# Patient Record
Sex: FEMALE
[De-identification: names, ages, dates, MRNs, and addresses within clinical notes are randomized per-mention and may not be internally consistent; named-entity substitution may affect disease eponyms.]

---

## 2022-11-03 ENCOUNTER — RESULT REVIEW (OUTPATIENT)
Age: 42
End: 2022-11-03

## 2022-11-03 ENCOUNTER — APPOINTMENT (OUTPATIENT)
Dept: RADIOLOGY | Facility: HOSPITAL | Age: 42
End: 2022-11-03

## 2022-11-03 ENCOUNTER — OUTPATIENT (OUTPATIENT)
Dept: OUTPATIENT SERVICES | Facility: HOSPITAL | Age: 42
LOS: 1 days | End: 2022-11-03

## 2022-11-03 DIAGNOSIS — R76.11 NONSPECIFIC REACTION TO TUBERCULIN SKIN TEST WITHOUT ACTIVE TUBERCULOSIS: ICD-10-CM

## 2022-11-03 PROCEDURE — 71046 X-RAY EXAM CHEST 2 VIEWS: CPT | Mod: 26

## 2024-01-01 ENCOUNTER — EMERGENCY (EMERGENCY)
Facility: HOSPITAL | Age: 44
LOS: 0 days | Discharge: ANOTHER HOSPITAL | End: 2024-01-02
Attending: STUDENT IN AN ORGANIZED HEALTH CARE EDUCATION/TRAINING PROGRAM
Payer: COMMERCIAL

## 2024-01-01 VITALS
RESPIRATION RATE: 20 BRPM | SYSTOLIC BLOOD PRESSURE: 118 MMHG | WEIGHT: 162.04 LBS | TEMPERATURE: 99 F | HEART RATE: 158 BPM | DIASTOLIC BLOOD PRESSURE: 74 MMHG | HEIGHT: 63 IN
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
## 2024-01-01 DIAGNOSIS — Z98.84 BARIATRIC SURGERY STATUS: ICD-10-CM
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
## 2024-01-01 DIAGNOSIS — F10.129 ALCOHOL ABUSE WITH INTOXICATION, UNSPECIFIED: ICD-10-CM

## 2024-01-01 DIAGNOSIS — S00.81XA ABRASION OF OTHER PART OF HEAD, INITIAL ENCOUNTER: ICD-10-CM

## 2024-01-01 DIAGNOSIS — F29 UNSPECIFIED PSYCHOSIS NOT DUE TO A SUBSTANCE OR KNOWN PHYSIOLOGICAL CONDITION: ICD-10-CM

## 2024-01-01 DIAGNOSIS — R45.1 RESTLESSNESS AND AGITATION: ICD-10-CM

## 2024-01-01 DIAGNOSIS — Z20.822 CONTACT WITH AND (SUSPECTED) EXPOSURE TO COVID-19: ICD-10-CM

## 2024-01-01 LAB
ALBUMIN SERPL ELPH-MCNC: 3.4 G/DL — SIGNIFICANT CHANGE UP (ref 3.3–5)
ALBUMIN SERPL ELPH-MCNC: 3.4 G/DL — SIGNIFICANT CHANGE UP (ref 3.3–5)
ALP SERPL-CCNC: 72 U/L — SIGNIFICANT CHANGE UP (ref 40–120)
ALP SERPL-CCNC: 72 U/L — SIGNIFICANT CHANGE UP (ref 40–120)
ALT FLD-CCNC: 50 U/L — SIGNIFICANT CHANGE UP (ref 12–78)
ALT FLD-CCNC: 50 U/L — SIGNIFICANT CHANGE UP (ref 12–78)
ANION GAP SERPL CALC-SCNC: 8 MMOL/L — SIGNIFICANT CHANGE UP (ref 5–17)
ANION GAP SERPL CALC-SCNC: 8 MMOL/L — SIGNIFICANT CHANGE UP (ref 5–17)
AST SERPL-CCNC: 39 U/L — HIGH (ref 15–37)
AST SERPL-CCNC: 39 U/L — HIGH (ref 15–37)
BASOPHILS # BLD AUTO: 0.05 K/UL — SIGNIFICANT CHANGE UP (ref 0–0.2)
BASOPHILS # BLD AUTO: 0.05 K/UL — SIGNIFICANT CHANGE UP (ref 0–0.2)
BASOPHILS NFR BLD AUTO: 0.9 % — SIGNIFICANT CHANGE UP (ref 0–2)
BASOPHILS NFR BLD AUTO: 0.9 % — SIGNIFICANT CHANGE UP (ref 0–2)
BILIRUB SERPL-MCNC: 0.3 MG/DL — SIGNIFICANT CHANGE UP (ref 0.2–1.2)
BILIRUB SERPL-MCNC: 0.3 MG/DL — SIGNIFICANT CHANGE UP (ref 0.2–1.2)
BUN SERPL-MCNC: 11 MG/DL — SIGNIFICANT CHANGE UP (ref 7–23)
BUN SERPL-MCNC: 11 MG/DL — SIGNIFICANT CHANGE UP (ref 7–23)
CALCIUM SERPL-MCNC: 8.5 MG/DL — SIGNIFICANT CHANGE UP (ref 8.5–10.1)
CALCIUM SERPL-MCNC: 8.5 MG/DL — SIGNIFICANT CHANGE UP (ref 8.5–10.1)
CHLORIDE SERPL-SCNC: 113 MMOL/L — HIGH (ref 96–108)
CHLORIDE SERPL-SCNC: 113 MMOL/L — HIGH (ref 96–108)
CO2 SERPL-SCNC: 21 MMOL/L — LOW (ref 22–31)
CO2 SERPL-SCNC: 21 MMOL/L — LOW (ref 22–31)
CREAT SERPL-MCNC: 0.75 MG/DL — SIGNIFICANT CHANGE UP (ref 0.5–1.3)
CREAT SERPL-MCNC: 0.75 MG/DL — SIGNIFICANT CHANGE UP (ref 0.5–1.3)
EGFR: 101 ML/MIN/1.73M2 — SIGNIFICANT CHANGE UP
EGFR: 101 ML/MIN/1.73M2 — SIGNIFICANT CHANGE UP
EOSINOPHIL # BLD AUTO: 0.16 K/UL — SIGNIFICANT CHANGE UP (ref 0–0.5)
EOSINOPHIL # BLD AUTO: 0.16 K/UL — SIGNIFICANT CHANGE UP (ref 0–0.5)
EOSINOPHIL NFR BLD AUTO: 2.9 % — SIGNIFICANT CHANGE UP (ref 0–6)
EOSINOPHIL NFR BLD AUTO: 2.9 % — SIGNIFICANT CHANGE UP (ref 0–6)
ETHANOL SERPL-MCNC: 140 MG/DL — HIGH (ref 0–10)
ETHANOL SERPL-MCNC: 140 MG/DL — HIGH (ref 0–10)
GLUCOSE SERPL-MCNC: 113 MG/DL — HIGH (ref 70–99)
GLUCOSE SERPL-MCNC: 113 MG/DL — HIGH (ref 70–99)
HCG SERPL-ACNC: 1 MIU/ML — SIGNIFICANT CHANGE UP
HCG SERPL-ACNC: 1 MIU/ML — SIGNIFICANT CHANGE UP
HCT VFR BLD CALC: 37.2 % — SIGNIFICANT CHANGE UP (ref 34.5–45)
HCT VFR BLD CALC: 37.2 % — SIGNIFICANT CHANGE UP (ref 34.5–45)
HGB BLD-MCNC: 13.1 G/DL — SIGNIFICANT CHANGE UP (ref 11.5–15.5)
HGB BLD-MCNC: 13.1 G/DL — SIGNIFICANT CHANGE UP (ref 11.5–15.5)
IMM GRANULOCYTES NFR BLD AUTO: 0.2 % — SIGNIFICANT CHANGE UP (ref 0–0.9)
IMM GRANULOCYTES NFR BLD AUTO: 0.2 % — SIGNIFICANT CHANGE UP (ref 0–0.9)
LYMPHOCYTES # BLD AUTO: 1.7 K/UL — SIGNIFICANT CHANGE UP (ref 1–3.3)
LYMPHOCYTES # BLD AUTO: 1.7 K/UL — SIGNIFICANT CHANGE UP (ref 1–3.3)
LYMPHOCYTES # BLD AUTO: 30.5 % — SIGNIFICANT CHANGE UP (ref 13–44)
LYMPHOCYTES # BLD AUTO: 30.5 % — SIGNIFICANT CHANGE UP (ref 13–44)
MCHC RBC-ENTMCNC: 27.9 PG — SIGNIFICANT CHANGE UP (ref 27–34)
MCHC RBC-ENTMCNC: 27.9 PG — SIGNIFICANT CHANGE UP (ref 27–34)
MCHC RBC-ENTMCNC: 35.2 G/DL — SIGNIFICANT CHANGE UP (ref 32–36)
MCHC RBC-ENTMCNC: 35.2 G/DL — SIGNIFICANT CHANGE UP (ref 32–36)
MCV RBC AUTO: 79.1 FL — LOW (ref 80–100)
MCV RBC AUTO: 79.1 FL — LOW (ref 80–100)
MONOCYTES # BLD AUTO: 0.38 K/UL — SIGNIFICANT CHANGE UP (ref 0–0.9)
MONOCYTES # BLD AUTO: 0.38 K/UL — SIGNIFICANT CHANGE UP (ref 0–0.9)
MONOCYTES NFR BLD AUTO: 6.8 % — SIGNIFICANT CHANGE UP (ref 2–14)
MONOCYTES NFR BLD AUTO: 6.8 % — SIGNIFICANT CHANGE UP (ref 2–14)
NEUTROPHILS # BLD AUTO: 3.28 K/UL — SIGNIFICANT CHANGE UP (ref 1.8–7.4)
NEUTROPHILS # BLD AUTO: 3.28 K/UL — SIGNIFICANT CHANGE UP (ref 1.8–7.4)
NEUTROPHILS NFR BLD AUTO: 58.7 % — SIGNIFICANT CHANGE UP (ref 43–77)
NEUTROPHILS NFR BLD AUTO: 58.7 % — SIGNIFICANT CHANGE UP (ref 43–77)
NRBC # BLD: 0 /100 WBCS — SIGNIFICANT CHANGE UP (ref 0–0)
NRBC # BLD: 0 /100 WBCS — SIGNIFICANT CHANGE UP (ref 0–0)
PLATELET # BLD AUTO: 274 K/UL — SIGNIFICANT CHANGE UP (ref 150–400)
PLATELET # BLD AUTO: 274 K/UL — SIGNIFICANT CHANGE UP (ref 150–400)
POTASSIUM SERPL-MCNC: 3.8 MMOL/L — SIGNIFICANT CHANGE UP (ref 3.5–5.3)
POTASSIUM SERPL-MCNC: 3.8 MMOL/L — SIGNIFICANT CHANGE UP (ref 3.5–5.3)
POTASSIUM SERPL-SCNC: 3.8 MMOL/L — SIGNIFICANT CHANGE UP (ref 3.5–5.3)
POTASSIUM SERPL-SCNC: 3.8 MMOL/L — SIGNIFICANT CHANGE UP (ref 3.5–5.3)
PROT SERPL-MCNC: 7.1 GM/DL — SIGNIFICANT CHANGE UP (ref 6–8.3)
PROT SERPL-MCNC: 7.1 GM/DL — SIGNIFICANT CHANGE UP (ref 6–8.3)
RBC # BLD: 4.7 M/UL — SIGNIFICANT CHANGE UP (ref 3.8–5.2)
RBC # BLD: 4.7 M/UL — SIGNIFICANT CHANGE UP (ref 3.8–5.2)
RBC # FLD: 15.2 % — HIGH (ref 10.3–14.5)
RBC # FLD: 15.2 % — HIGH (ref 10.3–14.5)
SODIUM SERPL-SCNC: 142 MMOL/L — SIGNIFICANT CHANGE UP (ref 135–145)
SODIUM SERPL-SCNC: 142 MMOL/L — SIGNIFICANT CHANGE UP (ref 135–145)
WBC # BLD: 5.58 K/UL — SIGNIFICANT CHANGE UP (ref 3.8–10.5)
WBC # BLD: 5.58 K/UL — SIGNIFICANT CHANGE UP (ref 3.8–10.5)
WBC # FLD AUTO: 5.58 K/UL — SIGNIFICANT CHANGE UP (ref 3.8–10.5)
WBC # FLD AUTO: 5.58 K/UL — SIGNIFICANT CHANGE UP (ref 3.8–10.5)

## 2024-01-01 PROCEDURE — 90792 PSYCH DIAG EVAL W/MED SRVCS: CPT | Mod: 95

## 2024-01-01 PROCEDURE — 99285 EMERGENCY DEPT VISIT HI MDM: CPT

## 2024-01-01 RX ORDER — MIDAZOLAM HYDROCHLORIDE 1 MG/ML
2 INJECTION, SOLUTION INTRAMUSCULAR; INTRAVENOUS ONCE
Refills: 0 | Status: DISCONTINUED | OUTPATIENT
Start: 2024-01-01 | End: 2024-01-01

## 2024-01-01 RX ORDER — HALOPERIDOL DECANOATE 100 MG/ML
5 INJECTION INTRAMUSCULAR ONCE
Refills: 0 | Status: COMPLETED | OUTPATIENT
Start: 2024-01-01 | End: 2024-01-01

## 2024-01-01 RX ORDER — KETAMINE HYDROCHLORIDE 100 MG/ML
250 INJECTION INTRAMUSCULAR; INTRAVENOUS ONCE
Refills: 0 | Status: DISCONTINUED | OUTPATIENT
Start: 2024-01-01 | End: 2024-01-01

## 2024-01-01 RX ORDER — SODIUM CHLORIDE 9 MG/ML
1000 INJECTION INTRAMUSCULAR; INTRAVENOUS; SUBCUTANEOUS ONCE
Refills: 0 | Status: COMPLETED | OUTPATIENT
Start: 2024-01-01 | End: 2024-01-01

## 2024-01-01 RX ADMIN — HALOPERIDOL DECANOATE 5 MILLIGRAM(S): 100 INJECTION INTRAMUSCULAR at 20:42

## 2024-01-01 RX ADMIN — MIDAZOLAM HYDROCHLORIDE 2 MILLIGRAM(S): 1 INJECTION, SOLUTION INTRAMUSCULAR; INTRAVENOUS at 23:30

## 2024-01-01 RX ADMIN — KETAMINE HYDROCHLORIDE 250 MILLIGRAM(S): 100 INJECTION INTRAMUSCULAR; INTRAVENOUS at 20:41

## 2024-01-01 RX ADMIN — MIDAZOLAM HYDROCHLORIDE 2 MILLIGRAM(S): 1 INJECTION, SOLUTION INTRAMUSCULAR; INTRAVENOUS at 20:42

## 2024-01-01 RX ADMIN — SODIUM CHLORIDE 1000 MILLILITER(S): 9 INJECTION INTRAMUSCULAR; INTRAVENOUS; SUBCUTANEOUS at 21:33

## 2024-01-01 NOTE — ED ADULT NURSE NOTE - NSFALLUNIVINTERV_ED_ALL_ED
Bed/Stretcher in lowest position, wheels locked, appropriate side rails in place/Call bell, personal items and telephone in reach/Instruct patient to call for assistance before getting out of bed/chair/stretcher/Non-slip footwear applied when patient is off stretcher/[place] to call system/Physically safe environment - no spills, clutter or unnecessary equipment/Purposeful proactive rounding/Room/bathroom lighting operational, light cord in reach Bed/Stretcher in lowest position, wheels locked, appropriate side rails in place/Call bell, personal items and telephone in reach/Instruct patient to call for assistance before getting out of bed/chair/stretcher/Non-slip footwear applied when patient is off stretcher/[place] to call system/Physically safe environment - no spills, clutter or unnecessary equipment/Purposeful proactive rounding/Room/bathroom lighting operational, light cord in reach
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
## 2024-01-01 NOTE — ED BEHAVIORAL HEALTH ASSESSMENT NOTE - SUMMARY
44 yo female, currently domiciled at home with family, currently not working but previous employee with  with no previous pphx and pmh of obesity s/p gastric bypass 2011 that presented due to agitation in the setting of alcohol use. To note patient has no IP hospitalization, no previous SA, no previous self harm, no legal/violence hx, no substance use hx.      Patient brought in for agitation and some collateral from ED MD reports some concern for paranoia. Patient was agitated in the ED and trying to elope and thus received IM medication. Patient currently superficially cooperative and somewhat agitated but able to remain calm. Due to unclear situation which brought patient in and patient superficially cooperative and somewhat vague, will hold for collateral at this time.    PLan  -Hold for collateral   -Continue Home Medication which includes: no psych medication   -PRNs: Haldol 5/Ativan 2/Benadryl 50mg q6hr prn severe agitation; Hydroxyzine 25mg q6hr prn anxiety; monitor QTc, monitor for sedation, attempt verbal redirection

## 2024-01-01 NOTE — ED BEHAVIORAL HEALTH ASSESSMENT NOTE - DESCRIPTION
tried to elop. Received IM Lives with parents and 17yo son Lives with parents and 15yo son s/p gastric bypass tried to elope. Received IM

## 2024-01-01 NOTE — ED BEHAVIORAL HEALTH ASSESSMENT NOTE - RISK ASSESSMENT
RF: reported agitation in the ED, no outpatient care     PF: no SA/Self harm/IP, no reported access to gun     RM: hold for collateral
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
## 2024-01-01 NOTE — ED BEHAVIORAL HEALTH ASSESSMENT NOTE - NS ED BHA TELEPSYCH PROVIDER LOCATION
[address] [facility], [place], [place] [address] [facility], [place]

## 2024-01-01 NOTE — ED ADULT TRIAGE NOTE - SOURCE OF INFORMATION
[place]/[place]/EMS [facility] #[id number]/Patient/EMS [facility] #[id number]/Patient/EMS
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
## 2024-01-01 NOTE — ED BEHAVIORAL HEALTH ASSESSMENT NOTE - DETAILS
denies all unclear events leading to the ED unable to reach via phone currently being watched by grandparents

## 2024-01-01 NOTE — ED PROVIDER NOTE - CLINICAL SUMMARY MEDICAL DECISION MAKING FREE TEXT BOX
Pt with agitation/aggression at home-[facility] have received numerous calls to home for pt being agitated with family, + etoh intox endorsed. Paranoid delusions. Pt attempting to elope and aggressive with staff. sedated for pt and staff safety. Labs, ekg, ivf, tox screen ordered. Pending sobriety to eval need for psych eval. Pt with agitation/aggression at home-[facility] have received numerous calls to home for pt being agitated with family, + etoh intox endorsed. Paranoid delusions. Pt attempting to elope and aggressive with staff. sedated for pt and staff safety. Labs, ekg, ivf, tox screen ordered. Pending sobriety to eval need for psych eval. Pt with agitation/aggression at home-[facility] states have received numerous calls to home for pt being agitated with family, + etoh intox endorsed. Paranoid delusions. Pt attempting to elope and aggressive with staff. sedated for pt and staff safety. Labs, ekg, ivf, tox screen ordered. Pending sobriety to eval need for psych eval.    psychosis   transfer to inpatient Pt with agitation/aggression at home-[facility] states have received numerous calls to home for pt being agitated with family, + etoh intox endorsed. Paranoid delusions. Pt attempting to elope and aggressive with staff. sedated for pt and staff safety. Labs, ekg, ivf, tox screen ordered. Pending sobriety to eval need for psych eval.    psychosis   transfer to inpatient

## 2024-01-01 NOTE — ED BEHAVIORAL HEALTH ASSESSMENT NOTE - NSBHATTESTBILLING_PSY_A_CORE
[id number] diagnostic evaluation with medical services [id number] diagnostic evaluation with medical services

## 2024-01-01 NOTE — ED BEHAVIORAL HEALTH ASSESSMENT NOTE - OTHER PAST PSYCHIATRIC HISTORY (INCLUDE DETAILS REGARDING ONSET, COURSE OF ILLNESS, INPATIENT/OUTPATIENT TREATMENT)
Previous Dx: denies     Previous Med Trials: denies     Previous IP treatment:denies     Previous SA: denies     Self harming: denies     Current MH treatment: denies     Violence/Legal: denies
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
## 2024-01-02 ENCOUNTER — INPATIENT (INPATIENT)
Facility: HOSPITAL | Age: 44
LOS: 5 days | Discharge: HOME | DRG: 885 | End: 2024-01-08
Attending: PSYCHIATRY & NEUROLOGY | Admitting: PSYCHIATRY & NEUROLOGY
Payer: COMMERCIAL

## 2024-01-02 VITALS
SYSTOLIC BLOOD PRESSURE: 115 MMHG | TEMPERATURE: 98 F | RESPIRATION RATE: 18 BRPM | OXYGEN SATURATION: 100 % | DIASTOLIC BLOOD PRESSURE: 79 MMHG

## 2024-01-02 DIAGNOSIS — R45.1 RESTLESSNESS AND AGITATION: ICD-10-CM

## 2024-01-02 LAB
AMPHET UR-MCNC: NEGATIVE — SIGNIFICANT CHANGE UP
AMPHET UR-MCNC: NEGATIVE — SIGNIFICANT CHANGE UP
BARBITURATES UR SCN-MCNC: NEGATIVE — SIGNIFICANT CHANGE UP
BARBITURATES UR SCN-MCNC: NEGATIVE — SIGNIFICANT CHANGE UP
BENZODIAZ UR-MCNC: POSITIVE — SIGNIFICANT CHANGE UP
BENZODIAZ UR-MCNC: POSITIVE — SIGNIFICANT CHANGE UP
COCAINE METAB.OTHER UR-MCNC: NEGATIVE — SIGNIFICANT CHANGE UP
COCAINE METAB.OTHER UR-MCNC: NEGATIVE — SIGNIFICANT CHANGE UP
METHADONE UR-MCNC: NEGATIVE — SIGNIFICANT CHANGE UP
METHADONE UR-MCNC: NEGATIVE — SIGNIFICANT CHANGE UP
OPIATES UR-MCNC: NEGATIVE — SIGNIFICANT CHANGE UP
OPIATES UR-MCNC: NEGATIVE — SIGNIFICANT CHANGE UP
PCP SPEC-MCNC: SIGNIFICANT CHANGE UP
PCP SPEC-MCNC: SIGNIFICANT CHANGE UP
PCP UR-MCNC: NEGATIVE — SIGNIFICANT CHANGE UP
PCP UR-MCNC: NEGATIVE — SIGNIFICANT CHANGE UP
THC UR QL: NEGATIVE — SIGNIFICANT CHANGE UP
THC UR QL: NEGATIVE — SIGNIFICANT CHANGE UP

## 2024-01-02 PROCEDURE — 99213 OFFICE O/P EST LOW 20 MIN: CPT | Mod: 95

## 2024-01-02 RX ORDER — IBUPROFEN 200 MG
400 TABLET ORAL EVERY 6 HOURS
Refills: 0 | Status: DISCONTINUED | OUTPATIENT
Start: 2024-01-02 | End: 2024-01-08

## 2024-01-02 RX ORDER — ACETAMINOPHEN 500 MG
650 TABLET ORAL EVERY 6 HOURS
Refills: 0 | Status: DISCONTINUED | OUTPATIENT
Start: 2024-01-02 | End: 2024-01-08

## 2024-01-02 RX ORDER — LANOLIN ALCOHOL/MO/W.PET/CERES
3 CREAM (GRAM) TOPICAL AT BEDTIME
Refills: 0 | Status: DISCONTINUED | OUTPATIENT
Start: 2024-01-02 | End: 2024-01-08

## 2024-01-02 RX ORDER — HALOPERIDOL DECANOATE 100 MG/ML
5 INJECTION INTRAMUSCULAR EVERY 6 HOURS
Refills: 0 | Status: DISCONTINUED | OUTPATIENT
Start: 2024-01-02 | End: 2024-01-08

## 2024-01-02 RX ORDER — DIPHENHYDRAMINE HCL 50 MG
50 CAPSULE ORAL EVERY 6 HOURS
Refills: 0 | Status: DISCONTINUED | OUTPATIENT
Start: 2024-01-02 | End: 2024-01-08

## 2024-01-02 RX ADMIN — Medication 3 MILLIGRAM(S): at 21:53
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
## 2024-01-02 NOTE — ED BEHAVIORAL HEALTH PROGRESS NOTE - NS ED BHA PLAN PSYCHIATRIC ISSUES2 FT
For agitation, haldol 5mg with ativan 2mg and benadryl 50mg PO or IM if severe. Would start zyprexa 5mg PO qHS

## 2024-01-02 NOTE — BH PATIENT PROFILE - BRADEN NUTRITION
From: Karen Mendoza  To: Roman Dreyer, MD  Sent: 2/27/2020 9:29 AM CST  Subject: Lab Test or Test Related Question    Hello Dr Dreyer,    I wanted to know more about the degenerative changes and arthritis in my spine that the x-rays showed. I have started taking a calcium and vitamin d3 supplement. Is there anything else I can do?    Karen   (3) adequate

## 2024-01-02 NOTE — ED BEHAVIORAL HEALTH PROGRESS NOTE - CASE SUMMARY/FORMULATION (CLEARLY DOCUMENT RATIONALE FOR DISPOSITION CHANGE)
43F, living w parents and 17yo (w grandparents now), PMH of obesity s/p gastric bypass 2011, no psych hx, no SA or hosps, no self harm, now BIBEMS after she was agitated at home last night in setting of several months of paranoia.    Of primary concern patient left her job and has been breaking objects at home, orienting her life around paranoid delusions. Given the lack of any family or personal history of psychosis or psychiatric conditions, it is possible that symptoms are caused by phentermine though patient denies using in the last 2 days. She does not want to go home but meets criteria for involuntary admission at this time as she does not appear to be able to care for herself.  43F, living w parents and 15yo (w grandparents now), PMH of obesity s/p gastric bypass 2011, no psych hx, no SA or hosps, no self harm, now BIBEMS after she was agitated at home last night in setting of several months of paranoia.    Of primary concern patient left her job and has been breaking objects at home, orienting her life around paranoid delusions. Given the lack of any family or personal history of psychosis or psychiatric conditions, it is possible that symptoms are caused by phentermine though patient denies using in the last 2 days. She does not want to go home but meets criteria for involuntary admission at this time as she does not appear to be able to care for herself.

## 2024-01-02 NOTE — ED BEHAVIORAL HEALTH NOTE - BEHAVIORAL HEALTH NOTE
==================    PRE-HOSPITAL COURSE    ===================    SOURCE:  RN and secondhand ED documentation    DETAILS: Cherry Whitmore     =========    ED COURSE    =========    SOURCE:  RN and secondhand ED documentation.    ARRIVAL:  Per chart and RN, patient arrived ______. Per RN, patient was _______ upon arrival, and ______ with triage process.    BELONGINGS:  Per RN, patient arrived with ______. All belongings were provided to hospital security, and patient currently in a gown with a 1:1 staff member.    BEHAVIOR: RN described patient to be _______, presenting with ________ thought process, (AAOx3?), presenting with ____ mood and ____ affect, remains in ______ behavioral and impulse control, is ____ violent/aggressive. RN stated that the patient is (SI/HI/A/VH?). RN stated that there ______ visible marks, bruises, or lacerations on the body. RN stated that the patient appears to be _____-groomed, maintains ____ hygiene, and reports _____ ADLs, ambulates __________ (without assistance?)    TREATMENT:  Per chart and RN, patient (did/did not) PRN medications.    VISITORS:  Per RN,

## 2024-01-02 NOTE — BH PATIENT PROFILE - NSFALLSECTIONLABEL_GEN_A_CORE
Constitutional: no fever, no chills  CV: no chest pain  Resp: no cough, no shortness of breath  GI: no abdominal pain  MSK: no joint pain  Skin: +laceration  Neuro: no weakness, no paresthesias
.

## 2024-01-02 NOTE — BH PATIENT PROFILE - HOME MEDICATIONS
ferrous sulfate 325 mg oral delayed release tablet , 1 tab(s) orally 2 times a day 30 mins before meal with small glass of orange juice
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
## 2024-01-02 NOTE — ED BEHAVIORAL HEALTH NOTE - BEHAVIORAL HEALTH NOTE
==================    PRE-HOSPITAL COURSE    ===================    SOURCE:  RN and secondhand ED documentation    DETAILS:    =========    ED COURSE    =========    SOURCE:  RN and secondhand ED documentation.    ARRIVAL:  Per chart and RN, patient arrived ______. Per RN, patient was _______ upon arrival, and ______ with triage process.    BELONGINGS:  Per RN, patient arrived with ______. All belongings were provided to hospital security, and patient currently in a gown with a 1:1 staff member.    BEHAVIOR: RN described patient to be _______, presenting with ________ thought process, (AAOx3?), presenting with ____ mood and ____ affect, remains in ______ behavioral and impulse control, is ____ violent/aggressive. RN stated that the patient is (SI/HI/A/VH?). RN stated that there ______ visible marks, bruises, or lacerations on the body. RN stated that the patient appears to be _____-groomed, maintains ____ hygiene, and reports _____ ADLs, ambulates __________ (without assistance?)    TREATMENT:  Per chart and RN, patient (did/did not) PRN medications.    VISITORS:  Per RN,

## 2024-01-02 NOTE — ED BEHAVIORAL HEALTH NOTE - BEHAVIORAL HEALTH NOTE
Writer attempted to contact Modesto Rodgers per charting as emergency contact [phone] however number is disconnected. Writer attempted to contact Modesto Rodgers per charting as emergency contact [phone] however number is disconnected.

## 2024-01-02 NOTE — ED ADULT NURSE REASSESSMENT NOTE - NS ED NURSE REASSESS COMMENT FT1
Patient received on stretcher, asleep but easily arousable. 1:1 observation for safety maintained. Awaiting tele psych for re evaluation of patient capacity.
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
## 2024-01-02 NOTE — ED BEHAVIORAL HEALTH PROGRESS NOTE - NS ED BHA PLAN HOLD IN ED SUBSTANCE ISSUES2 FT
CYNDYWA- denies heavy drinking or prior withdrawal, states that she drinks every few days, brother reports that she used to have alcohol use disorder and has drank again more in the last few months

## 2024-01-02 NOTE — ED BEHAVIORAL HEALTH PROGRESS NOTE - DETAILS:
On interview patient states that she is feeling well, wants to go home. She acknowledges leaving job because she thought people were following her around but denies thinking that since, though she made comment about cars driving by and not knowing if people were looking at her or not. She denied auditory, visual, or tactile hallucinations, denies paranoia, denies suicide ideation or HI.

## 2024-01-02 NOTE — BH PATIENT PROFILE - FALL HARM RISK - UNIVERSAL INTERVENTIONS
Bed in lowest position, wheels locked, appropriate side rails in place/Call bell, personal items and telephone in reach/Instruct patient to call for assistance before getting out of bed or chair/Non-slip footwear when patient is out of bed/[place] to call system/Physically safe environment - no spills, clutter or unnecessary equipment/Purposeful Proactive Rounding/Room/bathroom lighting operational, light cord in reach Bed in lowest position, wheels locked, appropriate side rails in place/Call bell, personal items and telephone in reach/Instruct patient to call for assistance before getting out of bed or chair/Non-slip footwear when patient is out of bed/[place] to call system/Physically safe environment - no spills, clutter or unnecessary equipment/Purposeful Proactive Rounding/Room/bathroom lighting operational, light cord in reach

## 2024-01-02 NOTE — ED BEHAVIORAL HEALTH PROGRESS NOTE - BILLING CODES
53500-[facility] hospital care - low complexity 20-29 minutes [id number]-[facility] hospital care - low complexity 20-29 minutes

## 2024-01-02 NOTE — CHART NOTE - NSCHARTNOTEFT_GEN_A_CORE
S:The patient was seen for a brief assessment upon arrival on the unit. Upon approach she is lying in bed, she is calm and cooperative to interview. She denies any acute medical concerns, and denies any medical history. She reports taking a multivitamin daily and motrin for headaches, she is not prescribed any psychiatric medications. She states that when she was admitted she had been intoxicated on alcohol and states "alcohol does not agree with me". She denies any SI/HI/AH/VH, and states that she will inform the staff if she begins to have any SI. She denies any paranoia and states that she feels safe in the hospital. She asks to receive melatonin tonight for sleep.  O: Good eye contact, calm, cooperative, pleasant  A: No acute safety concerns  P: Melatonin 3 mg at bedtime for insomnia, Motrin 400 mg q6 PRN for headaches, multivitamin daily. Haldol 5 mg, Ativan 2 mg, Benadryl 50 mg for agitation. Will not start any standing psychiatric medications at this time. Primary team to evaluate in the AM

## 2024-01-03 VITALS
TEMPERATURE: 98 F | HEART RATE: 79 BPM | SYSTOLIC BLOOD PRESSURE: 124 MMHG | DIASTOLIC BLOOD PRESSURE: 85 MMHG | OXYGEN SATURATION: 97 % | RESPIRATION RATE: 18 BRPM

## 2024-01-03 LAB
A1C WITH ESTIMATED AVERAGE GLUCOSE RESULT: 4.9 % — SIGNIFICANT CHANGE UP (ref 4–5.6)
A1C WITH ESTIMATED AVERAGE GLUCOSE RESULT: 4.9 % — SIGNIFICANT CHANGE UP (ref 4–5.6)
ALBUMIN SERPL ELPH-MCNC: 4 G/DL — SIGNIFICANT CHANGE UP (ref 3.3–5)
ALBUMIN SERPL ELPH-MCNC: 4 G/DL — SIGNIFICANT CHANGE UP (ref 3.3–5)
ALP SERPL-CCNC: 80 U/L — SIGNIFICANT CHANGE UP (ref 40–120)
ALP SERPL-CCNC: 80 U/L — SIGNIFICANT CHANGE UP (ref 40–120)
ALT FLD-CCNC: 29 U/L — SIGNIFICANT CHANGE UP (ref 10–45)
ALT FLD-CCNC: 29 U/L — SIGNIFICANT CHANGE UP (ref 10–45)
ANION GAP SERPL CALC-SCNC: 11 MMOL/L — SIGNIFICANT CHANGE UP (ref 5–17)
ANION GAP SERPL CALC-SCNC: 11 MMOL/L — SIGNIFICANT CHANGE UP (ref 5–17)
AST SERPL-CCNC: 28 U/L — SIGNIFICANT CHANGE UP (ref 10–40)
AST SERPL-CCNC: 28 U/L — SIGNIFICANT CHANGE UP (ref 10–40)
BILIRUB SERPL-MCNC: 0.7 MG/DL — SIGNIFICANT CHANGE UP (ref 0.2–1.2)
BILIRUB SERPL-MCNC: 0.7 MG/DL — SIGNIFICANT CHANGE UP (ref 0.2–1.2)
BUN SERPL-MCNC: 6 MG/DL — LOW (ref 7–23)
BUN SERPL-MCNC: 6 MG/DL — LOW (ref 7–23)
CALCIUM SERPL-MCNC: 9.6 MG/DL — SIGNIFICANT CHANGE UP (ref 8.4–10.5)
CALCIUM SERPL-MCNC: 9.6 MG/DL — SIGNIFICANT CHANGE UP (ref 8.4–10.5)
CHLORIDE SERPL-SCNC: 103 MMOL/L — SIGNIFICANT CHANGE UP (ref 96–108)
CHLORIDE SERPL-SCNC: 103 MMOL/L — SIGNIFICANT CHANGE UP (ref 96–108)
CHOLEST SERPL-MCNC: 180 MG/DL — SIGNIFICANT CHANGE UP
CHOLEST SERPL-MCNC: 180 MG/DL — SIGNIFICANT CHANGE UP
CO2 SERPL-SCNC: 25 MMOL/L — SIGNIFICANT CHANGE UP (ref 22–31)
CO2 SERPL-SCNC: 25 MMOL/L — SIGNIFICANT CHANGE UP (ref 22–31)
CREAT SERPL-MCNC: 0.55 MG/DL — SIGNIFICANT CHANGE UP (ref 0.5–1.3)
CREAT SERPL-MCNC: 0.55 MG/DL — SIGNIFICANT CHANGE UP (ref 0.5–1.3)
EGFR: 117 ML/MIN/1.73M2 — SIGNIFICANT CHANGE UP
EGFR: 117 ML/MIN/1.73M2 — SIGNIFICANT CHANGE UP
ESTIMATED AVERAGE GLUCOSE: 94 MG/DL — SIGNIFICANT CHANGE UP (ref 68–114)
ESTIMATED AVERAGE GLUCOSE: 94 MG/DL — SIGNIFICANT CHANGE UP (ref 68–114)
GLUCOSE SERPL-MCNC: 98 MG/DL — SIGNIFICANT CHANGE UP (ref 70–99)
GLUCOSE SERPL-MCNC: 98 MG/DL — SIGNIFICANT CHANGE UP (ref 70–99)
HCT VFR BLD CALC: 36.8 % — SIGNIFICANT CHANGE UP (ref 34.5–45)
HCT VFR BLD CALC: 36.8 % — SIGNIFICANT CHANGE UP (ref 34.5–45)
HDLC SERPL-MCNC: 91 MG/DL — SIGNIFICANT CHANGE UP
HDLC SERPL-MCNC: 91 MG/DL — SIGNIFICANT CHANGE UP
HGB BLD-MCNC: 13.1 G/DL — SIGNIFICANT CHANGE UP (ref 11.5–15.5)
HGB BLD-MCNC: 13.1 G/DL — SIGNIFICANT CHANGE UP (ref 11.5–15.5)
LIPID PNL WITH DIRECT LDL SERPL: 79 MG/DL — SIGNIFICANT CHANGE UP
LIPID PNL WITH DIRECT LDL SERPL: 79 MG/DL — SIGNIFICANT CHANGE UP
MCHC RBC-ENTMCNC: 27.8 PG — SIGNIFICANT CHANGE UP (ref 27–34)
MCHC RBC-ENTMCNC: 27.8 PG — SIGNIFICANT CHANGE UP (ref 27–34)
MCHC RBC-ENTMCNC: 35.6 GM/DL — SIGNIFICANT CHANGE UP (ref 32–36)
MCHC RBC-ENTMCNC: 35.6 GM/DL — SIGNIFICANT CHANGE UP (ref 32–36)
MCV RBC AUTO: 78.1 FL — LOW (ref 80–100)
MCV RBC AUTO: 78.1 FL — LOW (ref 80–100)
NON HDL CHOLESTEROL: 89 MG/DL — SIGNIFICANT CHANGE UP
NON HDL CHOLESTEROL: 89 MG/DL — SIGNIFICANT CHANGE UP
NRBC # BLD: 0 /100 WBCS — SIGNIFICANT CHANGE UP (ref 0–0)
NRBC # BLD: 0 /100 WBCS — SIGNIFICANT CHANGE UP (ref 0–0)
PLATELET # BLD AUTO: 293 K/UL — SIGNIFICANT CHANGE UP (ref 150–400)
PLATELET # BLD AUTO: 293 K/UL — SIGNIFICANT CHANGE UP (ref 150–400)
POTASSIUM SERPL-MCNC: 3.9 MMOL/L — SIGNIFICANT CHANGE UP (ref 3.5–5.3)
POTASSIUM SERPL-MCNC: 3.9 MMOL/L — SIGNIFICANT CHANGE UP (ref 3.5–5.3)
POTASSIUM SERPL-SCNC: 3.9 MMOL/L — SIGNIFICANT CHANGE UP (ref 3.5–5.3)
POTASSIUM SERPL-SCNC: 3.9 MMOL/L — SIGNIFICANT CHANGE UP (ref 3.5–5.3)
PROT SERPL-MCNC: 6.6 G/DL — SIGNIFICANT CHANGE UP (ref 6–8.3)
PROT SERPL-MCNC: 6.6 G/DL — SIGNIFICANT CHANGE UP (ref 6–8.3)
RBC # BLD: 4.71 M/UL — SIGNIFICANT CHANGE UP (ref 3.8–5.2)
RBC # BLD: 4.71 M/UL — SIGNIFICANT CHANGE UP (ref 3.8–5.2)
RBC # FLD: 15.1 % — HIGH (ref 10.3–14.5)
RBC # FLD: 15.1 % — HIGH (ref 10.3–14.5)
SODIUM SERPL-SCNC: 139 MMOL/L — SIGNIFICANT CHANGE UP (ref 135–145)
SODIUM SERPL-SCNC: 139 MMOL/L — SIGNIFICANT CHANGE UP (ref 135–145)
TRIGL SERPL-MCNC: 48 MG/DL — SIGNIFICANT CHANGE UP
TRIGL SERPL-MCNC: 48 MG/DL — SIGNIFICANT CHANGE UP
TSH SERPL-MCNC: 2.25 UIU/ML — SIGNIFICANT CHANGE UP (ref 0.27–4.2)
TSH SERPL-MCNC: 2.25 UIU/ML — SIGNIFICANT CHANGE UP (ref 0.27–4.2)
WBC # BLD: 6.08 K/UL — SIGNIFICANT CHANGE UP (ref 3.8–10.5)
WBC # BLD: 6.08 K/UL — SIGNIFICANT CHANGE UP (ref 3.8–10.5)
WBC # FLD AUTO: 6.08 K/UL — SIGNIFICANT CHANGE UP (ref 3.8–10.5)
WBC # FLD AUTO: 6.08 K/UL — SIGNIFICANT CHANGE UP (ref 3.8–10.5)

## 2024-01-03 RX ORDER — NICOTINE POLACRILEX 2 MG
2 GUM BUCCAL
Refills: 0 | Status: DISCONTINUED | OUTPATIENT
Start: 2024-01-03 | End: 2024-01-08

## 2024-01-03 RX ADMIN — Medication 1 TABLET(S): at 13:39

## 2024-01-03 RX ADMIN — Medication 3 MILLIGRAM(S): at 21:03

## 2024-01-03 NOTE — BH INPATIENT PSYCHIATRY ASSESSMENT NOTE - CURRENT PASSIVE IDEATION:
Per [facility] no auth needed for patients tests     Patient completed tests on 4.10.19   None known

## 2024-01-03 NOTE — BH INPATIENT PSYCHIATRY ASSESSMENT NOTE - CURRENT MEDICATION
MEDICATIONS  (STANDING):  melatonin. 3 milliGRAM(s) Oral at bedtime  multivitamin 1 Tablet(s) Oral daily    MEDICATIONS  (PRN):  acetaminophen     Tablet .. 650 milliGRAM(s) Oral every 6 hours PRN Temp greater or equal to 38.5C (101.3F) (1st line), Mild Pain (1 - 3)  aluminum hydroxide/magnesium hydroxide/simethicone Suspension 30 milliLiter(s) Oral every 6 hours PRN Dyspepsia  diphenhydrAMINE 50 milliGRAM(s) Oral every 6 hours PRN Anxiety and Insomnia  haloperidol     Tablet 5 milliGRAM(s) Oral every 6 hours PRN aggression  ibuprofen  Tablet. 400 milliGRAM(s) Oral every 6 hours PRN Temp greater or equal to 38C (100.4F) (2nd line), Moderate Pain (4 - 6)  LORazepam     Tablet 2 milliGRAM(s) Oral every 6 hours PRN agitation
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
## 2024-01-03 NOTE — BH INPATIENT PSYCHIATRY ASSESSMENT NOTE - NSREFERRALDIRECTTXFER_PSY_ALL_CORE
Outside Hospital ([facility] Outside Hospital ([facility]

## 2024-01-03 NOTE — BH SOCIAL WORK INITIAL PSYCHOSOCIAL EVALUATION - NSBHCHILDAGEFT_PSY_ALL_CORE
15yo Male, currently under the care of pt's parents 17yo Male, currently under the care of pt's parents

## 2024-01-03 NOTE — BH INPATIENT PSYCHIATRY ASSESSMENT NOTE - NSBHCHARTREVIEWVS_PSY_A_CORE FT
Vital Signs Last 24 Hrs  T(C): 36.4 (01-03-24 @ 09:07), Max: 36.7 (01-02-24 @ 15:47)  T(F): 97.6 (01-03-24 @ 09:07), Max: 98.1 (01-02-24 @ 17:18)  HR: 79 (01-03-24 @ 09:07) (72 - 79)  BP: 124/85 (01-03-24 @ 09:07) (115/79 - 124/85)  BP(mean): 98 (01-03-24 @ 09:07) (98 - 98)  RR: 18 (01-03-24 @ 09:07) (18 - 18)  SpO2: 97% (01-03-24 @ 09:07) (97% - 100%)

## 2024-01-03 NOTE — BH INPATIENT PSYCHIATRY ASSESSMENT NOTE - NSBHATTESTAPPBILLTIME_PSY_A_CORE
I attest my time as NEYMAR is greater than 50% of the total combined time spent on qualifying patient care activities. I have reviewed and verified the documentation.

## 2024-01-03 NOTE — BH INPATIENT PSYCHIATRY ASSESSMENT NOTE - NSBHMETABOLIC_PSY_ALL_CORE_FT
BMI: BMI (kg/m2): 28.7 (01-01-24 @ 20:08)  HbA1c: A1C with Estimated Average Glucose Result: 4.9 % (01-03-24 @ 05:30)    Glucose:   BP: 124/85 (01-03-24 @ 09:07) (124/85 - 124/85)Vital Signs Last 24 Hrs  T(C): 36.4 (01-03-24 @ 09:07), Max: 36.7 (01-02-24 @ 15:47)  T(F): 97.6 (01-03-24 @ 09:07), Max: 98.1 (01-02-24 @ 17:18)  HR: 79 (01-03-24 @ 09:07) (72 - 79)  BP: 124/85 (01-03-24 @ 09:07) (115/79 - 124/85)  BP(mean): 98 (01-03-24 @ 09:07) (98 - 98)  RR: 18 (01-03-24 @ 09:07) (18 - 18)  SpO2: 97% (01-03-24 @ 09:07) (97% - 100%)      Lipid Panel: Date/Time: 01-03-24 @ 05:30  Cholesterol, Serum: 180  LDL Cholesterol Calculated: 79  HDL Cholesterol, Serum: 91  Total Cholesterol/HDL Ration Measurement: --  Triglycerides, Serum: 48

## 2024-01-03 NOTE — BH SOCIAL WORK INITIAL PSYCHOSOCIAL EVALUATION - NSBHFINANCESOCIAL_PSY_ALL_CORE
None Dutasteride Counseling: Dustasteride Counseling:  I discussed with the patient the risks of use of dutasteride including but not limited to decreased libido, decreased ejaculate volume, and gynecomastia. Women who can become pregnant should not handle medication.  All of the patient's questions and concerns were addressed. Dutasteride Male Counseling: Dustasteride Counseling:  I discussed with the patient the risks of use of dutasteride including but not limited to decreased libido, decreased ejaculate volume, and gynecomastia. Women who can become pregnant should not handle medication.  All of the patient's questions and concerns were addressed.

## 2024-01-04 PROCEDURE — 99231 SBSQ HOSP IP/OBS SF/LOW 25: CPT

## 2024-01-04 RX ORDER — DIPHENHYDRAMINE HCL 50 MG
25 CAPSULE ORAL ONCE
Refills: 0 | Status: COMPLETED | OUTPATIENT
Start: 2024-01-04 | End: 2024-01-04

## 2024-01-04 RX ADMIN — Medication 1 TABLET(S): at 10:46

## 2024-01-04 RX ADMIN — Medication 50 MILLIGRAM(S): at 21:26

## 2024-01-04 RX ADMIN — Medication 25 MILLIGRAM(S): at 01:14

## 2024-01-04 NOTE — BH INPATIENT PSYCHIATRY PROGRESS NOTE - NSBHCHARTREVIEWVS_PSY_A_CORE FT
Vital Signs Last 24 Hrs  T(C): 36.7 (01-04-24 @ 08:49), Max: 36.7 (01-04-24 @ 08:49)  T(F): 98 (01-04-24 @ 08:49), Max: 98 (01-04-24 @ 08:49)  HR: 72 (01-04-24 @ 08:49) (72 - 72)  BP: 121/85 (01-04-24 @ 08:49) (121/85 - 121/85)  BP(mean): 97 (01-04-24 @ 08:49) (97 - 97)  RR: 18 (01-04-24 @ 08:49) (18 - 18)  SpO2: 98% (01-04-24 @ 08:49) (98% - 98%)     Vital Signs Last 24 Hrs  T(C): 37.2 (01-05-24 @ 15:56), Max: 37.2 (01-05-24 @ 15:56)  T(F): 99 (01-05-24 @ 15:56), Max: 99 (01-05-24 @ 15:56)  HR: 75 (01-05-24 @ 15:56) (66 - 80)  BP: 114/79 (01-05-24 @ 15:56) (114/79 - 123/83)  BP(mean): --  RR: 18 (01-05-24 @ 15:56) (18 - 18)  SpO2: 100% (01-05-24 @ 15:56) (100% - 100%)

## 2024-01-04 NOTE — BH CHART NOTE - NSEVENTNOTEFT_PSY_ALL_CORE
Physical Exam     General: WN/WD in NAD. Cooperative, dressed in hospital scrubs, lying on bed reading a book.   Face: Excoriations to L cheek area  Eyes: PERRLA, EOMI.   Mouth: Tonsils and pharynx without erythema or exudate. Tonsils not enlarged. Uvula midline. Uvula rises symmetrically.   Cardiac: RRR. S1 and S2 clear. No murmurs, gallops or rubs.   Respiratory: Breath sounds vesicular, symmetrical and without rales, rhonchi or wheezing.   Abdominal: Non-distended. No scars, lesions or visible pulsations. Soft, nontender, no masses palpated. No rebound, guarding. Bowel sounds normoactive.   Neuro: Speech and language coherent. CN II, III, IV, VI, VII, IX, X intact. Strength 5/5 bilaterally in upper and lower extremities.

## 2024-01-04 NOTE — BH INPATIENT PSYCHIATRY PROGRESS NOTE - NSBHATTESTBILLING_PSY_A_CORE
99223-Initial OBS or IP - high complexity OR  mins 27099-Hlcgqupcwi OBS or IP - low complexity OR 25-34 mins 92759-Nqhwijvilk OBS or IP - low complexity OR 25-34 mins

## 2024-01-04 NOTE — BH INPATIENT PSYCHIATRY PROGRESS NOTE - NSBHMETABOLIC_PSY_ALL_CORE_FT
BMI: BMI (kg/m2): 28.7 (01-01-24 @ 20:08)  HbA1c: A1C with Estimated Average Glucose Result: 4.9 % (01-03-24 @ 05:30)    Glucose:   BP: 121/85 (01-04-24 @ 08:49) (119/84 - 124/85)Vital Signs Last 24 Hrs  T(C): 36.7 (01-04-24 @ 08:49), Max: 36.7 (01-04-24 @ 08:49)  T(F): 98 (01-04-24 @ 08:49), Max: 98 (01-04-24 @ 08:49)  HR: 72 (01-04-24 @ 08:49) (72 - 72)  BP: 121/85 (01-04-24 @ 08:49) (121/85 - 121/85)  BP(mean): 97 (01-04-24 @ 08:49) (97 - 97)  RR: 18 (01-04-24 @ 08:49) (18 - 18)  SpO2: 98% (01-04-24 @ 08:49) (98% - 98%)      Lipid Panel: Date/Time: 01-03-24 @ 05:30  Cholesterol, Serum: 180  LDL Cholesterol Calculated: 79  HDL Cholesterol, Serum: 91  Total Cholesterol/HDL Ration Measurement: --  Triglycerides, Serum: 48   BMI: BMI (kg/m2): 28.7 (01-01-24 @ 20:08)  HbA1c: A1C with Estimated Average Glucose Result: 4.9 % (01-03-24 @ 05:30)    Glucose:   BP: 114/79 (01-05-24 @ 15:56) (114/79 - 124/85)Vital Signs Last 24 Hrs  T(C): 37.2 (01-05-24 @ 15:56), Max: 37.2 (01-05-24 @ 15:56)  T(F): 99 (01-05-24 @ 15:56), Max: 99 (01-05-24 @ 15:56)  HR: 75 (01-05-24 @ 15:56) (66 - 80)  BP: 114/79 (01-05-24 @ 15:56) (114/79 - 123/83)  BP(mean): --  RR: 18 (01-05-24 @ 15:56) (18 - 18)  SpO2: 100% (01-05-24 @ 15:56) (100% - 100%)      Lipid Panel: Date/Time: 01-03-24 @ 05:30  Cholesterol, Serum: 180  LDL Cholesterol Calculated: 79  HDL Cholesterol, Serum: 91  Total Cholesterol/HDL Ration Measurement: --  Triglycerides, Serum: 48

## 2024-01-04 NOTE — BH INPATIENT PSYCHIATRY PROGRESS NOTE - NSBHFUPINTERVALHXFT_PSY_A_CORE
Patient seen with PA-s today in her room, cooperative and pleasant on approach. Discharge focused, not interested in starting medications    *in progress Patient seen with Navdeep today in her room, cooperative and pleasant on approach. Discharge focused, not interested in starting medications at this time. Sleep is unchanged, she reports hx of poor sleep. No changes in appetite. 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 No si/hi/avh expressed. Is still endorsing paranoid ideation that she is being followed/watched at home, but is not distressed about it.

## 2024-01-04 NOTE — BH INPATIENT PSYCHIATRY PROGRESS NOTE - NSBHASSESSSUMMFT_PSY_ALL_CORE
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 Medical hx significant for obesity s/p gastric bypass 2011, currently taking phentermine prescribed by PCP. No formal psychiatric history, no previous hospitalizations, not currently in treatment with psychiatry or therapy, not taking any psychiatric medications. No hx of substance abuse or rehabs. Patient has recently made 5 police reports, emailed FBI and . No current legal issues. Patient presents to [facility] by police for aggression and physical altercatio with family. BAL on presentation was 140. Patient attempted to elope from ED and was given IM PRNs. Transferred to [facility] and admitted 2pc.    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 Medical hx significant for obesity s/p gastric bypass 2011, currently taking phentermine prescribed by PCP. No formal psychiatric history, no previous hospitalizations, not currently in treatment with psychiatry or therapy, not taking any psychiatric medications. No hx of substance abuse or rehabs. Patient has recently made 5 police reports, emailed FBI and . No current legal issues. Patient presents to [facility] by police for aggression and physical altercatio with family. BAL on presentation was 140. Patient attempted to elope from ED and was given IM PRNs. Transferred to [facility] and admitted 2pc.

## 2024-01-05 DIAGNOSIS — F22 DELUSIONAL DISORDERS: ICD-10-CM

## 2024-01-05 PROCEDURE — 99232 SBSQ HOSP IP/OBS MODERATE 35: CPT

## 2024-01-05 RX ORDER — SENNA PLUS 8.6 MG/1
2 TABLET ORAL AT BEDTIME
Refills: 0 | Status: DISCONTINUED | OUTPATIENT
Start: 2024-01-05 | End: 2024-01-06

## 2024-01-05 RX ADMIN — Medication 1 TABLET(S): at 10:05

## 2024-01-05 RX ADMIN — SENNA PLUS 2 TABLET(S): 8.6 TABLET ORAL at 22:18

## 2024-01-05 RX ADMIN — Medication 650 MILLIGRAM(S): at 20:59

## 2024-01-05 RX ADMIN — Medication 400 MILLIGRAM(S): at 23:35

## 2024-01-05 RX ADMIN — Medication 650 MILLIGRAM(S): at 21:59

## 2024-01-05 RX ADMIN — Medication 400 MILLIGRAM(S): at 06:38

## 2024-01-05 NOTE — BH INPATIENT PSYCHIATRY PROGRESS NOTE - NSBHFUPINTERVALHXFT_PSY_A_CORE
Patient seen with REFUGIO and Navdeep today in her room, irritable on approach, states that she submitted a 72hr letter today and demands to be discharged. She cites needing to pay her son's private tuition by Monday as well as needing to take care of his uniforms as reasons for discharge. Expressing some paranoid ideation regarding being followed at home, but states that she is not distressed about that and is able to function without issue. No si/hi/avh endorsed. Poor sleep last night due to room temp and not feeling comfortable on the unit.   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  Writer spoke with mother who shared no concerns of patient safety, is disappointed that pt is not on medications,but feel comfortable having her home.    Patient seen with REFUGIO and Navdeep today in her room, irritable on approach, states that she submitted a 72hr letter today and demands to be discharged. She cites needing to pay her son's private tuition by Monday as well as needing to take care of his uniforms as reasons for discharge. Expressing some paranoid ideation regarding being followed at home, but states that she is not distressed about that and is able to function without issue. No si/hi/avh endorsed. Poor sleep last night due to room temp and not feeling comfortable on the unit.   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  Writer spoke with mother who shared no concerns of patient safety, is disappointed that pt is not on medications,but feel comfortable having her home.

## 2024-01-05 NOTE — BH INPATIENT PSYCHIATRY PROGRESS NOTE - NSBHATTESTAPPBILLTIME_PSY_A_CORE
I attest my time as NEYMAR is greater than 50% of the total combined time spent on qualifying patient care activities. I have reviewed and verified the documentation.
I attest my time as NEYMAR is greater than 50% of the total combined time spent on qualifying patient care activities. I have reviewed and verified the documentation.

## 2024-01-05 NOTE — BH INPATIENT PSYCHIATRY PROGRESS NOTE - NSBHASSESSSUMMFT_PSY_ALL_CORE
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 Medical hx significant for obesity s/p gastric bypass 2011, currently taking phentermine prescribed by PCP. No formal psychiatric history, no previous hospitalizations, not currently in treatment with psychiatry or therapy, not taking any psychiatric medications. No hx of substance abuse or rehabs. Patient has recently made 5 police reports, emailed FBI and . No current legal issues. Patient presents to [facility] by police for aggression and physical altercatio with family. BAL on presentation was 140. Patient attempted to elope from ED and was given IM PRNs. Transferred to [facility] and admitted 2pc.       1/5 72 hour letter submitted, discharge focused, declining medications, remains with paranoid ideation. 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 Medical hx significant for obesity s/p gastric bypass 2011, currently taking phentermine prescribed by PCP. No formal psychiatric history, no previous hospitalizations, not currently in treatment with psychiatry or therapy, not taking any psychiatric medications. No hx of substance abuse or rehabs. Patient has recently made 5 police reports, emailed FBI and . No current legal issues. Patient presents to [facility] by police for aggression and physical altercatio with family. BAL on presentation was 140. Patient attempted to elope from ED and was given IM PRNs. Transferred to [facility] and admitted 2pc.       1/5 72 hour letter submitted, discharge focused, declining medications, remains with paranoid ideation. Denies si/hi/avh or PI

## 2024-01-05 NOTE — BH INPATIENT PSYCHIATRY PROGRESS NOTE - NSBHCHARTREVIEWVS_PSY_A_CORE FT
Vital Signs Last 24 Hrs  T(C): 37.2 (01-05-24 @ 15:56), Max: 37.2 (01-05-24 @ 15:56)  T(F): 99 (01-05-24 @ 15:56), Max: 99 (01-05-24 @ 15:56)  HR: 75 (01-05-24 @ 15:56) (66 - 80)  BP: 114/79 (01-05-24 @ 15:56) (114/79 - 123/83)  BP(mean): --  RR: 18 (01-05-24 @ 15:56) (18 - 18)  SpO2: 100% (01-05-24 @ 15:56) (100% - 100%)

## 2024-01-05 NOTE — BH INPATIENT PSYCHIATRY PROGRESS NOTE - NSBHMETABOLIC_PSY_ALL_CORE_FT
BMI: BMI (kg/m2): 28.7 (01-01-24 @ 20:08)  HbA1c: A1C with Estimated Average Glucose Result: 4.9 % (01-03-24 @ 05:30)    Glucose:   BP: 114/79 (01-05-24 @ 15:56) (114/79 - 124/85)Vital Signs Last 24 Hrs  T(C): 37.2 (01-05-24 @ 15:56), Max: 37.2 (01-05-24 @ 15:56)  T(F): 99 (01-05-24 @ 15:56), Max: 99 (01-05-24 @ 15:56)  HR: 75 (01-05-24 @ 15:56) (66 - 80)  BP: 114/79 (01-05-24 @ 15:56) (114/79 - 123/83)  BP(mean): --  RR: 18 (01-05-24 @ 15:56) (18 - 18)  SpO2: 100% (01-05-24 @ 15:56) (100% - 100%)      Lipid Panel: Date/Time: 01-03-24 @ 05:30  Cholesterol, Serum: 180  LDL Cholesterol Calculated: 79  HDL Cholesterol, Serum: 91  Total Cholesterol/HDL Ration Measurement: --  Triglycerides, Serum: 48

## 2024-01-05 NOTE — BH INPATIENT PSYCHIATRY PROGRESS NOTE - NSBHATTESTBILLING_PSY_A_CORE
78640-Zozxoutofj OBS or IP - moderate complexity OR 35-49 mins 20149-Ponrqwihtk OBS or IP - moderate complexity OR 35-49 mins

## 2024-01-06 RX ORDER — SENNA PLUS 8.6 MG/1
2 TABLET ORAL
Refills: 0 | Status: DISCONTINUED | OUTPATIENT
Start: 2024-01-06 | End: 2024-01-08

## 2024-01-06 RX ADMIN — Medication 650 MILLIGRAM(S): at 15:24

## 2024-01-06 RX ADMIN — Medication 50 MILLIGRAM(S): at 20:45

## 2024-01-06 RX ADMIN — Medication 1 TABLET(S): at 10:18

## 2024-01-06 RX ADMIN — Medication 650 MILLIGRAM(S): at 14:50

## 2024-01-06 NOTE — BH INPATIENT PSYCHIATRY PROGRESS NOTE - NSBHFUPINTERVALHXFT_PSY_A_CORE
Spoke with patient who still would like to go home, but understands that discharge discussions only happen during weekdays. She is calm, pleasant, linear as far as I was able to determine, however patient was quite guarded and concrete in her responses. Does seem to have some residual paranoia about being tracked when at home.     Discussed possibility of phentermine contributing to psychosis, but patient states this is not likely as she has been on the med for over a year, takes it as prescribed, and has not had recent dose increases

## 2024-01-06 NOTE — BH INPATIENT PSYCHIATRY PROGRESS NOTE - NSBHCHARTREVIEWVS_PSY_A_CORE FT
Vital Signs Last 24 Hrs  T(C): 37.1 (01-06-24 @ 08:18), Max: 37.2 (01-05-24 @ 15:56)  T(F): 98.8 (01-06-24 @ 08:18), Max: 99 (01-05-24 @ 15:56)  HR: 75 (01-06-24 @ 08:18) (75 - 75)  BP: 110/77 (01-06-24 @ 08:18) (110/77 - 114/79)  BP(mean): --  RR: 18 (01-05-24 @ 15:56) (18 - 18)  SpO2: 100% (01-06-24 @ 08:18) (100% - 100%)

## 2024-01-06 NOTE — BH INPATIENT PSYCHIATRY PROGRESS NOTE - NSBHMETABOLIC_PSY_ALL_CORE_FT
BMI: BMI (kg/m2): 28.7 (01-01-24 @ 20:08)  HbA1c: A1C with Estimated Average Glucose Result: 4.9 % (01-03-24 @ 05:30)    Glucose:   BP: 110/77 (01-06-24 @ 08:18) (110/77 - 123/83)Vital Signs Last 24 Hrs  T(C): 37.1 (01-06-24 @ 08:18), Max: 37.2 (01-05-24 @ 15:56)  T(F): 98.8 (01-06-24 @ 08:18), Max: 99 (01-05-24 @ 15:56)  HR: 75 (01-06-24 @ 08:18) (75 - 75)  BP: 110/77 (01-06-24 @ 08:18) (110/77 - 114/79)  BP(mean): --  RR: 18 (01-05-24 @ 15:56) (18 - 18)  SpO2: 100% (01-06-24 @ 08:18) (100% - 100%)      Lipid Panel: Date/Time: 01-03-24 @ 05:30  Cholesterol, Serum: 180  LDL Cholesterol Calculated: 79  HDL Cholesterol, Serum: 91  Total Cholesterol/HDL Ration Measurement: --  Triglycerides, Serum: 48

## 2024-01-06 NOTE — BH INPATIENT PSYCHIATRY PROGRESS NOTE - NSBHASSESSSUMMFT_PSY_ALL_CORE
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 Medical hx significant for obesity s/p gastric bypass 2011, currently taking phentermine prescribed by PCP. No formal psychiatric history, no previous hospitalizations, not currently in treatment with psychiatry or therapy, not taking any psychiatric medications. No hx of substance abuse or rehabs. Patient has recently made 5 police reports, emailed FBI and . No current legal issues. Patient presents to [facility] by police for aggression and physical altercatio with family. BAL on presentation was 140. Patient attempted to elope from ED and was given IM PRNs. Transferred to [facility] and admitted 2pc.       1/5 72 hour letter submitted, discharge focused, declining medications, remains with paranoid ideation. 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 Medical hx significant for obesity s/p gastric bypass 2011, currently taking phentermine prescribed by PCP. No formal psychiatric history, no previous hospitalizations, not currently in treatment with psychiatry or therapy, not taking any psychiatric medications. No hx of substance abuse or rehabs. Patient has recently made 5 police reports, emailed FBI and . No current legal issues. Patient presents to [facility] by police for aggression and physical altercatio with family. BAL on presentation was 140. Patient attempted to elope from ED and was given IM PRNs. Transferred to [facility] and admitted 2pc.       1/5 72 hour letter submitted, discharge focused, declining medications, remains with paranoid ideation. Denies si/hi/avh or PI

## 2024-01-06 NOTE — BH INPATIENT PSYCHIATRY PROGRESS NOTE - NSBHATTESTTYPEVISIT_PSY_A_CORE
Resident/Fellow with telephonic supervision
On-site Attending supervising NEYMAR (99XXX codes)
On-site Attending supervising NEYMAR (99XXX codes)

## 2024-01-07 LAB
RAPID RVP RESULT: DETECTED
RAPID RVP RESULT: DETECTED
SARS-COV-2 RNA SPEC QL NAA+PROBE: DETECTED
SARS-COV-2 RNA SPEC QL NAA+PROBE: DETECTED

## 2024-01-07 RX ORDER — BENZOCAINE AND MENTHOL 5; 1 G/100ML; G/100ML
1 LIQUID ORAL
Refills: 0 | Status: DISCONTINUED | OUTPATIENT
Start: 2024-01-07 | End: 2024-01-08

## 2024-01-07 RX ADMIN — Medication 50 MILLIGRAM(S): at 20:56

## 2024-01-07 RX ADMIN — Medication 650 MILLIGRAM(S): at 09:43

## 2024-01-07 RX ADMIN — Medication 400 MILLIGRAM(S): at 00:30

## 2024-01-07 RX ADMIN — BENZOCAINE AND MENTHOL 1 LOZENGE: 5; 1 LIQUID ORAL at 20:57

## 2024-01-07 RX ADMIN — Medication 100 MILLIGRAM(S): at 17:24

## 2024-01-07 RX ADMIN — Medication 1 TABLET(S): at 09:43

## 2024-01-07 RX ADMIN — Medication 650 MILLIGRAM(S): at 10:00

## 2024-01-07 NOTE — BH INPATIENT PSYCHIATRY PROGRESS NOTE - NSBHFUPINTERVALCCFT_PSY_A_CORE
"I feel like my old self."
'I am doing ok'
'I might put in a 72 hour letter'
'I want to be discharged today'

## 2024-01-07 NOTE — BH INPATIENT PSYCHIATRY PROGRESS NOTE - NSBHFUPINTERVALHXFT_PSY_A_CORE
Pt reports she feels "happy ... like my old self" and is ready to return home. Pt acknowledged feeling stressed and anxious around the time of admission and that has resolved now. Pt endorses sleeping well and eating well.

## 2024-01-07 NOTE — BH INPATIENT PSYCHIATRY PROGRESS NOTE - NSDCCRITERIA_PSY_ALL_CORE
Improvement in mood and sxs

## 2024-01-07 NOTE — BH INPATIENT PSYCHIATRY PROGRESS NOTE - NSICDXBHPRIMARYDX_PSY_ALL_CORE
Paranoid ideation   F22  

## 2024-01-07 NOTE — BH INPATIENT PSYCHIATRY PROGRESS NOTE - NSBHASSESSSUMMFT_PSY_ALL_CORE
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 Medical hx significant for obesity s/p gastric bypass 2011, currently taking phentermine prescribed by PCP. No formal psychiatric history, no previous hospitalizations, not currently in treatment with psychiatry or therapy, not taking any psychiatric medications. No hx of substance abuse or rehabs. Patient has recently made 5 police reports, emailed FBI and . No current legal issues. Patient presents to [facility] by police for aggression and physical altercatio with family. BAL on presentation was 140. Patient attempted to elope from ED and was given IM PRNs. Transferred to [facility] and admitted 2pc.       1/5 72 hour letter submitted, discharge focused, declining medications, remains with paranoid ideation. 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 Medical hx significant for obesity s/p gastric bypass 2011, currently taking phentermine prescribed by PCP. No formal psychiatric history, no previous hospitalizations, not currently in treatment with psychiatry or therapy, not taking any psychiatric medications. No hx of substance abuse or rehabs. Patient has recently made 5 police reports, emailed FBI and . No current legal issues. Patient presents to [facility] by police for aggression and physical altercatio with family. BAL on presentation was 140. Patient attempted to elope from ED and was given IM PRNs. Transferred to [facility] and admitted 2pc.       1/5 72 hour letter submitted, discharge focused, declining medications, remains with paranoid ideation. Denies si/hi/avh or PI

## 2024-01-07 NOTE — BH INPATIENT PSYCHIATRY PROGRESS NOTE - NSTXPSYCHOGOAL_PSY_ALL_CORE
Will verbalize decreased distress related to psychosis to 2 on a 10-point scale

## 2024-01-07 NOTE — BH INPATIENT PSYCHIATRY PROGRESS NOTE - NSICDXBHSECONDARYDX_PSY_ALL_CORE
Psychosis, paranoid   F22  

## 2024-01-07 NOTE — BH INPATIENT PSYCHIATRY PROGRESS NOTE - CURRENT MEDICATION
MEDICATIONS  (STANDING):  melatonin. 3 milliGRAM(s) Oral at bedtime  multivitamin 1 Tablet(s) Oral daily    MEDICATIONS  (PRN):  acetaminophen     Tablet .. 650 milliGRAM(s) Oral every 6 hours PRN Temp greater or equal to 38.5C (101.3F) (1st line), Mild Pain (1 - 3)  aluminum hydroxide/magnesium hydroxide/simethicone Suspension 30 milliLiter(s) Oral every 6 hours PRN Dyspepsia  diphenhydrAMINE 50 milliGRAM(s) Oral every 6 hours PRN Anxiety and Insomnia  haloperidol     Tablet 5 milliGRAM(s) Oral every 6 hours PRN aggression  ibuprofen  Tablet. 400 milliGRAM(s) Oral every 6 hours PRN Temp greater or equal to 38C (100.4F) (2nd line), Moderate Pain (4 - 6)  LORazepam     Tablet 2 milliGRAM(s) Oral every 6 hours PRN agitation  nicotine  Polacrilex Gum 2 milliGRAM(s) Oral every 2 hours PRN nrt  
MEDICATIONS  (STANDING):  melatonin. 3 milliGRAM(s) Oral at bedtime  multivitamin 1 Tablet(s) Oral daily    MEDICATIONS  (PRN):  acetaminophen     Tablet .. 650 milliGRAM(s) Oral every 6 hours PRN Temp greater or equal to 38.5C (101.3F) (1st line), Mild Pain (1 - 3)  aluminum hydroxide/magnesium hydroxide/simethicone Suspension 30 milliLiter(s) Oral every 6 hours PRN Dyspepsia  diphenhydrAMINE 50 milliGRAM(s) Oral every 6 hours PRN Anxiety and Insomnia  guaiFENesin Oral Liquid (Sugar-Free) 100 milliGRAM(s) Oral every 6 hours PRN Cough  haloperidol     Tablet 5 milliGRAM(s) Oral every 6 hours PRN aggression  ibuprofen  Tablet. 400 milliGRAM(s) Oral every 6 hours PRN Temp greater or equal to 38C (100.4F) (2nd line), Moderate Pain (4 - 6)  LORazepam     Tablet 2 milliGRAM(s) Oral every 6 hours PRN agitation  nicotine  Polacrilex Gum 2 milliGRAM(s) Oral every 2 hours PRN nrt  senna 2 Tablet(s) Oral two times a day PRN constipation  
MEDICATIONS  (STANDING):  melatonin. 3 milliGRAM(s) Oral at bedtime  multivitamin 1 Tablet(s) Oral daily    MEDICATIONS  (PRN):  acetaminophen     Tablet .. 650 milliGRAM(s) Oral every 6 hours PRN Temp greater or equal to 38.5C (101.3F) (1st line), Mild Pain (1 - 3)  aluminum hydroxide/magnesium hydroxide/simethicone Suspension 30 milliLiter(s) Oral every 6 hours PRN Dyspepsia  diphenhydrAMINE 50 milliGRAM(s) Oral every 6 hours PRN Anxiety and Insomnia  haloperidol     Tablet 5 milliGRAM(s) Oral every 6 hours PRN aggression  ibuprofen  Tablet. 400 milliGRAM(s) Oral every 6 hours PRN Temp greater or equal to 38C (100.4F) (2nd line), Moderate Pain (4 - 6)  LORazepam     Tablet 2 milliGRAM(s) Oral every 6 hours PRN agitation  nicotine  Polacrilex Gum 2 milliGRAM(s) Oral every 2 hours PRN nrt  
MEDICATIONS  (STANDING):  melatonin. 3 milliGRAM(s) Oral at bedtime  multivitamin 1 Tablet(s) Oral daily    MEDICATIONS  (PRN):  acetaminophen     Tablet .. 650 milliGRAM(s) Oral every 6 hours PRN Temp greater or equal to 38.5C (101.3F) (1st line), Mild Pain (1 - 3)  aluminum hydroxide/magnesium hydroxide/simethicone Suspension 30 milliLiter(s) Oral every 6 hours PRN Dyspepsia  diphenhydrAMINE 50 milliGRAM(s) Oral every 6 hours PRN Anxiety and Insomnia  haloperidol     Tablet 5 milliGRAM(s) Oral every 6 hours PRN aggression  ibuprofen  Tablet. 400 milliGRAM(s) Oral every 6 hours PRN Temp greater or equal to 38C (100.4F) (2nd line), Moderate Pain (4 - 6)  LORazepam     Tablet 2 milliGRAM(s) Oral every 6 hours PRN agitation  nicotine  Polacrilex Gum 2 milliGRAM(s) Oral every 2 hours PRN nrt  senna 2 Tablet(s) Oral at bedtime PRN constipation

## 2024-01-07 NOTE — BH INPATIENT PSYCHIATRY PROGRESS NOTE - PRN MEDS
MEDICATIONS  (PRN):  acetaminophen     Tablet .. 650 milliGRAM(s) Oral every 6 hours PRN Temp greater or equal to 38.5C (101.3F) (1st line), Mild Pain (1 - 3)  aluminum hydroxide/magnesium hydroxide/simethicone Suspension 30 milliLiter(s) Oral every 6 hours PRN Dyspepsia  diphenhydrAMINE 50 milliGRAM(s) Oral every 6 hours PRN Anxiety and Insomnia  haloperidol     Tablet 5 milliGRAM(s) Oral every 6 hours PRN aggression  ibuprofen  Tablet. 400 milliGRAM(s) Oral every 6 hours PRN Temp greater or equal to 38C (100.4F) (2nd line), Moderate Pain (4 - 6)  LORazepam     Tablet 2 milliGRAM(s) Oral every 6 hours PRN agitation  nicotine  Polacrilex Gum 2 milliGRAM(s) Oral every 2 hours PRN nrt  senna 2 Tablet(s) Oral at bedtime PRN constipation  
MEDICATIONS  (PRN):  acetaminophen     Tablet .. 650 milliGRAM(s) Oral every 6 hours PRN Temp greater or equal to 38.5C (101.3F) (1st line), Mild Pain (1 - 3)  aluminum hydroxide/magnesium hydroxide/simethicone Suspension 30 milliLiter(s) Oral every 6 hours PRN Dyspepsia  diphenhydrAMINE 50 milliGRAM(s) Oral every 6 hours PRN Anxiety and Insomnia  guaiFENesin Oral Liquid (Sugar-Free) 100 milliGRAM(s) Oral every 6 hours PRN Cough  haloperidol     Tablet 5 milliGRAM(s) Oral every 6 hours PRN aggression  ibuprofen  Tablet. 400 milliGRAM(s) Oral every 6 hours PRN Temp greater or equal to 38C (100.4F) (2nd line), Moderate Pain (4 - 6)  LORazepam     Tablet 2 milliGRAM(s) Oral every 6 hours PRN agitation  nicotine  Polacrilex Gum 2 milliGRAM(s) Oral every 2 hours PRN nrt  senna 2 Tablet(s) Oral two times a day PRN constipation  
MEDICATIONS  (PRN):  acetaminophen     Tablet .. 650 milliGRAM(s) Oral every 6 hours PRN Temp greater or equal to 38.5C (101.3F) (1st line), Mild Pain (1 - 3)  aluminum hydroxide/magnesium hydroxide/simethicone Suspension 30 milliLiter(s) Oral every 6 hours PRN Dyspepsia  diphenhydrAMINE 50 milliGRAM(s) Oral every 6 hours PRN Anxiety and Insomnia  haloperidol     Tablet 5 milliGRAM(s) Oral every 6 hours PRN aggression  ibuprofen  Tablet. 400 milliGRAM(s) Oral every 6 hours PRN Temp greater or equal to 38C (100.4F) (2nd line), Moderate Pain (4 - 6)  LORazepam     Tablet 2 milliGRAM(s) Oral every 6 hours PRN agitation  nicotine  Polacrilex Gum 2 milliGRAM(s) Oral every 2 hours PRN nrt  
MEDICATIONS  (PRN):  acetaminophen     Tablet .. 650 milliGRAM(s) Oral every 6 hours PRN Temp greater or equal to 38.5C (101.3F) (1st line), Mild Pain (1 - 3)  aluminum hydroxide/magnesium hydroxide/simethicone Suspension 30 milliLiter(s) Oral every 6 hours PRN Dyspepsia  diphenhydrAMINE 50 milliGRAM(s) Oral every 6 hours PRN Anxiety and Insomnia  haloperidol     Tablet 5 milliGRAM(s) Oral every 6 hours PRN aggression  ibuprofen  Tablet. 400 milliGRAM(s) Oral every 6 hours PRN Temp greater or equal to 38C (100.4F) (2nd line), Moderate Pain (4 - 6)  LORazepam     Tablet 2 milliGRAM(s) Oral every 6 hours PRN agitation  nicotine  Polacrilex Gum 2 milliGRAM(s) Oral every 2 hours PRN nrt

## 2024-01-07 NOTE — BH INPATIENT PSYCHIATRY PROGRESS NOTE - NSBHCHARTREVIEWVS_PSY_A_CORE FT
2021 9:24 AM IRON Will (:  1985) is a 28 y.o. female,New patient, here for evaluation of the following chief complaint(s):  Sinus Problem (2 mo follow up flonase and septoplasty)      ASSESSMENT/PLAN:  1. S/P FESS (functional endoscopic sinus surgery)    2. S/P nasal septoplasty    3. Nasal congestion      1. S/P FESS (functional endoscopic sinus surgery)  2. S/P nasal septoplasty  3. Nasal congestion    Continue maria g med sinus rinse once a day  Continue flonase nasal spray twice daily  Start Astelin nasal spray twice daily  Fu in 3 months for 6-month postop    No follow-ups on file. SUBJECTIVE/OBJECTIVE:  HPI  70-year-old woman with chronic sinusitis. She endorses a strong childhood history of allergies. She also has a strong family history of allergies. They improved as she grew older and last year she would take a Claritin only as needed. She endorses 1-2 episodes of sinus infection versus upper respiratory infections a year. More recently over the last few months she has been on 3 courses of antibiotics starting with a Z-Eulogio and then Bactrim, Flonase, antihistamine and currently is on Levaquin, prednisone. Her current symptoms include facial pain and pressure, worse on left side, bilateral nasal obstruction, green nasal drainage, denies change in smell. She has had minimal improvement in her left-sided facial pressure, the most prominent feature. She denies vision changes. Endorses some dryness to the front of her nose. Her current episode started with copious sinus drainage. She also experienced left facial swelling but that has improved on prednisone. She has completed 3-week course of antibiotics but continues to have headaches and sinus pressure. He also endorses some nasal obstruction.     CT sinus performed 2021 at [facility]:  FINDINGS:            The paranasal sinuses are paired and well-developed bilaterally.            There is moderate chronic right and mild (1%) and lidocaine (4%) to the bilateral nasal cavity by spray. The endoscope was inserted and advanced through the bilateral side of the nose examining the mucosa and nasal structures. Right:  Inferior turbinate normal, patent maxillary ostia, middle meatus clear, no polyps or purulence  Left:  Inferior turbinate mild edema posteriorly, patent maxillary ostia and ethmoids, middle meatus clear, no polyps or purulence    An electronic signature was used to authenticate this note.     --Fam Vazquez MD     8/9/2021 9:24 AM EST Vital Signs Last 24 Hrs  T(C): 37.4 (01-06-24 @ 17:14), Max: 37.4 (01-06-24 @ 17:14)  T(F): 99.4 (01-06-24 @ 17:14), Max: 99.4 (01-06-24 @ 17:14)  HR: 91 (01-06-24 @ 17:14) (91 - 91)  BP: 103/71 (01-06-24 @ 17:14) (103/71 - 103/71)  BP(mean): --  RR: --  SpO2: 99% (01-06-24 @ 17:14) (99% - 99%)

## 2024-01-07 NOTE — BH INPATIENT PSYCHIATRY PROGRESS NOTE - NSBHMETABOLIC_PSY_ALL_CORE_FT
BMI: BMI (kg/m2): 28.7 (01-01-24 @ 20:08)  HbA1c: A1C with Estimated Average Glucose Result: 4.9 % (01-03-24 @ 05:30)    Glucose:   BP: 103/71 (01-06-24 @ 17:14) (103/71 - 123/83)Vital Signs Last 24 Hrs  T(C): 37.4 (01-06-24 @ 17:14), Max: 37.4 (01-06-24 @ 17:14)  T(F): 99.4 (01-06-24 @ 17:14), Max: 99.4 (01-06-24 @ 17:14)  HR: 91 (01-06-24 @ 17:14) (91 - 91)  BP: 103/71 (01-06-24 @ 17:14) (103/71 - 103/71)  BP(mean): --  RR: --  SpO2: 99% (01-06-24 @ 17:14) (99% - 99%)      Lipid Panel: Date/Time: 01-03-24 @ 05:30  Cholesterol, Serum: 180  LDL Cholesterol Calculated: 79  HDL Cholesterol, Serum: 91  Total Cholesterol/HDL Ration Measurement: --  Triglycerides, Serum: 48

## 2024-01-08 VITALS
HEART RATE: 98 BPM | TEMPERATURE: 99 F | RESPIRATION RATE: 17 BRPM | OXYGEN SATURATION: 98 % | DIASTOLIC BLOOD PRESSURE: 77 MMHG | SYSTOLIC BLOOD PRESSURE: 110 MMHG

## 2024-01-08 PROCEDURE — 85027 COMPLETE CBC AUTOMATED: CPT

## 2024-01-08 PROCEDURE — 99238 HOSP IP/OBS DSCHRG MGMT 30/<: CPT

## 2024-01-08 PROCEDURE — 0225U NFCT DS DNA&RNA 21 SARSCOV2: CPT

## 2024-01-08 PROCEDURE — 83036 HEMOGLOBIN GLYCOSYLATED A1C: CPT

## 2024-01-08 PROCEDURE — 80061 LIPID PANEL: CPT

## 2024-01-08 PROCEDURE — 80053 COMPREHEN METABOLIC PANEL: CPT

## 2024-01-08 PROCEDURE — 36415 COLL VENOUS BLD VENIPUNCTURE: CPT

## 2024-01-08 PROCEDURE — 84443 ASSAY THYROID STIM HORMONE: CPT

## 2024-01-08 RX ADMIN — Medication 1 TABLET(S): at 10:03

## 2024-01-08 NOTE — BH DISCHARGE NOTE NURSING/SOCIAL WORK/PSYCH REHAB - PATIENT PORTAL LINK FT
You can access the FollowMyHealth Patient Portal offered by [facility] by registering at the following website: [email or web address]. By joining [facility]’s FollowMyHealth portal, you will also be able to view your health information using other applications (apps) compatible with our system. You can access the FollowMyHealth Patient Portal offered by [facility] by registering at the following website: [email or web address]. By joining [facility]’s FollowMyHealth portal, you will also be able to view your health information using other applications (apps) compatible with our system.
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
## 2024-01-08 NOTE — BH DISCHARGE NOTE NURSING/SOCIAL WORK/PSYCH REHAB - NSDCPRGOAL_PSY_ALL_CORE
Pt attended select groups during admission.  Pt has demonstrated moderate range of affect and has been mostly pleasant on approach.  Pt has been social with select peers.  Pt has been future-focused and wanting to leave the hospital.  Pt endorsed readiness for discharge and completed a safety plan.
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
## 2024-01-08 NOTE — BH DISCHARGE NOTE NURSING/SOCIAL WORK/PSYCH REHAB - NSDCOTHERNUM_GEN_ALL_CORE
[phone]  [email or web address]  [phone]  [email or web address]

## 2024-01-08 NOTE — BH INPATIENT PSYCHIATRY DISCHARGE NOTE - NSDCCPCAREPLAN_GEN_ALL_CORE_FT
PRINCIPAL DISCHARGE DIAGNOSIS  Diagnosis: Psychosis, paranoid  Assessment and Plan of Treatment: Follow up with outpatient appointments      SECONDARY DISCHARGE DIAGNOSES  Diagnosis: Substance induced mood disorder  Assessment and Plan of Treatment: Follow up with outpatient appointments

## 2024-01-08 NOTE — BH INPATIENT PSYCHIATRY DISCHARGE NOTE - NSBHMETABOLIC_PSY_ALL_CORE_FT
BMI: BMI (kg/m2): 28.7 (01-01-24 @ 20:08)  HbA1c: A1C with Estimated Average Glucose Result: 4.9 % (01-03-24 @ 05:30)    Glucose:   BP: 109/72 (01-07-24 @ 18:33) (103/71 - 114/79)Vital Signs Last 24 Hrs  T(C): 37.4 (01-07-24 @ 18:33), Max: 37.4 (01-07-24 @ 18:33)  T(F): 99.3 (01-07-24 @ 18:33), Max: 99.3 (01-07-24 @ 18:33)  HR: 98 (01-07-24 @ 18:33) (98 - 98)  BP: 109/72 (01-07-24 @ 18:33) (109/72 - 109/72)  BP(mean): --  RR: --  SpO2: 96% (01-07-24 @ 18:33) (96% - 96%)      Lipid Panel: Date/Time: 01-03-24 @ 05:30  Cholesterol, Serum: 180  LDL Cholesterol Calculated: 79  HDL Cholesterol, Serum: 91  Total Cholesterol/HDL Ration Measurement: --  Triglycerides, Serum: 48

## 2024-01-08 NOTE — BH DISCHARGE NOTE NURSING/SOCIAL WORK/PSYCH REHAB - NSCDUDCCRISIS_PSY_A_CORE
[facility] Well  1 (284) [facility]-WELL ([phone])  Text "WELL" to 21983  Website: www.[employer]/.Safe Horizons 1 (531) 621-HOPE (3077) Website: www.safehorizon.org/.National Suicide Prevention Lifeline 2 (526) 051-8929/.  Lif[facility]et  1 (396) LIFENET (756-5133)/.  [facility]  [phone] 225-7139/.  [facility] Behavioral Health Helpline / [facility] Crisis  (599) 227-TALK (7127)/.  [facility] Behavioral Health Crisis Center  [address] [place] [address] [phone]   Hours:  Monday through Friday from 9 AM to 3 PM/988 Suicide and Crisis Lifeline [facility] Well  1 (611) [facility]-WELL ([phone])  Text "WELL" to 13031  Website: www.[employer]/.Safe Horizons 1 (269) 621-HOPE (1095) Website: www.safehorizon.org/.National Suicide Prevention Lifeline 3 (508) 258-7009/.  [facility]et  1 (932) LIFENET (639-5245)/.  [facility]  (228) 404-8840/.  [facility] Behavioral Health Helpline / [facility] Crisis  (013) 227-TALK (4039)/.  [facility] Behavioral Health Crisis Center  [address] [place] [address] [phone]   Hours:  Monday through Friday from 9 AM to 3 PM/988 Suicide and Crisis Lifeline

## 2024-01-08 NOTE — BH INPATIENT PSYCHIATRY DISCHARGE NOTE - NSBHDCBILLING_PSY_ALL_CORE
80877 (Hospital discharge day management; 30 min or less) [id number] (Hospital discharge day management; 30 min or less)

## 2024-01-08 NOTE — BH DISCHARGE NOTE NURSING/SOCIAL WORK/PSYCH REHAB - NSDCADDINFO1FT_PSY_ALL_CORE
Appointment on Friday, 01/12/2024 at 11:20AM. This is a virtual intake appointment. Please complete the intake packet sent to your email at [email or web address]. Your [facility] Care website login credentials are as follows:    Username: [email or web address]  Password: [email or web address] Appointment on Friday, 01/12/2024 at 11:20AM. This is a virtual intake appointment. Please complete the intake packet sent to your email at [email or web address]. Your [facility] Care website login credentials are as follows:    Username: [email or web address]  Password: [email or web address]

## 2024-01-08 NOTE — BH DISCHARGE NOTE NURSING/SOCIAL WORK/PSYCH REHAB - NSTOBACCOOTHER_PSY_ALL_CORE_FT
Call [facility] Smokers' Quitline at 3-655-RL-QUITS ([phone]) or visit [email or web address] if interested.  Call [facility] Smokers' Quitline at 0-699-IP-QUITS ([phone]) or visit [email or web address] if interested.

## 2024-01-08 NOTE — BH INPATIENT PSYCHIATRY DISCHARGE NOTE - HOSPITAL COURSE
Patient declined medications throughout her admission, stating that she would be open to being referred to an outpatient therapist and if they felt that she would benefit from medications, she would explore that route. Throughout her admission, patient expressed paranoid ideation regarding having been followed around for the last several months. However began to state that it wasn't distressing to her anymore and that she was able to function and return back to work at the end of the month. She consistently denied si/hi/avh during admission. She was visible on the unit, at times reading her book in her room and other times attending select groups. Collateral information was attained from pt's mother during her admission.   Covid + on 1/7/24 and was placed on contact precaution until her discharge.   72 hour letter was submitted on 1/5/24 and this was honored as pt no longer met criteria for involuntary admission.
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
## 2024-01-08 NOTE — BH DISCHARGE NOTE NURSING/SOCIAL WORK/PSYCH REHAB - [facility]
Unit Name: [address] Unit Phone Number: [phone] Unit Name: [address] Phone Number: [phone]

## 2024-01-08 NOTE — BH SAFETY PLAN - SUICIDE PREVENTION LIFELINE PHONES
Suicide Prevention Lifeline Phone: [phone]- TALK ([phone]) Suicide Prevention Lifeline Phone: [phone]- TALK ([phone])

## 2024-01-08 NOTE — BH INPATIENT PSYCHIATRY DISCHARGE NOTE - NSBHFUPINTERVALHXFT_PSY_A_CORE
Saw patient with NP Rosalva Haq this morning. Patient was sitting comfortably at her desk writing in a journal and using the tablet provided to her. She is aware that she tested positive for COVID but was relieved to know it won't impact her discharge. Patient has not been taking any standing medications therefore will not be sent home with any prescriptions. Patient informed that she will have an outpatient appointment set up for her and she agreed that she wants to go. Patient denies SI, HI, AVH, people out to get her. Patient reports sinus pressure but denies chest pain, cough, SOB.  Saw patient with NP Rosalva Haq this morning. Patient was sitting comfortably at her desk writing in a journal and using the tablet provided to her. She is aware that she tested positive for COVID but was relieved to know it won't impact her discharge. Patient has not been taking any standing medications therefore will not be sent home with any prescriptions. Patient informed that she will have an outpatient appointment set up for her and she agreed that she wants to go. Patient denies SI, HI, AVH, people out to get her. Patient reports sinus pressure but denies chest pain, cough, SOB. Patient appropriate for discharge.

## 2024-01-10 DIAGNOSIS — F22 DELUSIONAL DISORDERS: ICD-10-CM

## 2024-01-10 DIAGNOSIS — Z72.0 TOBACCO USE: ICD-10-CM

## 2024-01-10 DIAGNOSIS — F10.90 ALCOHOL USE, UNSPECIFIED, UNCOMPLICATED: ICD-10-CM

## 2024-01-10 DIAGNOSIS — U07.1 COVID-19: ICD-10-CM

## 2024-01-10 DIAGNOSIS — D51.0 VITAMIN B12 DEFICIENCY ANEMIA DUE TO INTRINSIC FACTOR DEFICIENCY: ICD-10-CM

## 2024-01-10 DIAGNOSIS — F19.14 OTHER PSYCHOACTIVE SUBSTANCE ABUSE WITH PSYCHOACTIVE SUBSTANCE-INDUCED MOOD DISORDER: ICD-10-CM

## 2024-01-10 DIAGNOSIS — Z98.84 BARIATRIC SURGERY STATUS: ICD-10-CM

## 2024-01-10 NOTE — BH SOCIAL WORK CONFIRMATION FOLLOW UP NOTE - NSCOMMENTS_PSY_ALL_CORE
CARING CONTACT [[id number]] @ [7935] Patient discharged on [01/08/2024]. No caring call required as patient was assessed as LOW ACUTE SUICIDE RISK on admission.  ---  Linkage Appt:  [facility] (Virtual), T: [phone]  01/12/2024 at 11:2AM CARING CONTACT [[id number]] @ [8196] Patient discharged on [01/08/2024]. No caring call required as patient was assessed as LOW ACUTE SUICIDE RISK on admission.  ---  Linkage Appt:  [facility] (Virtual), T: [phone]  01/12/2024 at 11:2AM CARING CONTACT [[id number]] @ [5409] Patient discharged on [01/08/2024]. No caring call required as patient was assessed as LOW ACUTE SUICIDE RISK on admission.  ---  Linkage Appt: This SW called [facility] ([phone]) on 1/12 and spoke with representative Briana who confirmed the pt attended her scheduled appointment:     [facility] (Virtual), T: [phone]  01/12/2024 at 11:2AM CARING CONTACT [[id number]] @ [0062] Patient discharged on [01/08/2024]. No caring call required as patient was assessed as LOW ACUTE SUICIDE RISK on admission.  ---  Linkage Appt: This SW called [facility] ([phone]) on 1/12 and spoke with representative Briana who confirmed the pt attended her scheduled appointment:     [facility] (Virtual), T: [phone]  01/12/2024 at 11:2AM

## 2024-04-19 NOTE — BH INPATIENT PSYCHIATRY ASSESSMENT NOTE - OTHER PAST PSYCHIATRIC HISTORY (INCLUDE DETAILS REGARDING ONSET, COURSE OF ILLNESS, INPATIENT/OUTPATIENT TREATMENT)
Last OV: 02/08/2024   Previous Dx: denies     Previous Med Trials: denies     Previous IP treatment:denies     Previous SA: denies     Self harming: denies     Current MH treatment: denies     Violence/Legal: denies

## 2024-05-03 NOTE — ED ADULT NURSE NOTE - NS ED PATIENT SAFETY CONCERN
Anti-infective approval note/Event Note AMA/Event Note Nutrition Services Nutrition Services Nutrition Services No

## 2024-12-29 ENCOUNTER — INPATIENT (INPATIENT)
Facility: HOSPITAL | Age: 44
LOS: 15 days | Discharge: LEFT BEFORE DISCHARGE | End: 2025-01-14
Attending: STUDENT IN AN ORGANIZED HEALTH CARE EDUCATION/TRAINING PROGRAM | Admitting: PSYCHIATRY & NEUROLOGY
Payer: MEDICAID

## 2024-12-29 VITALS
OXYGEN SATURATION: 99 % | HEIGHT: 63 IN | HEART RATE: 128 BPM | DIASTOLIC BLOOD PRESSURE: 90 MMHG | RESPIRATION RATE: 18 BRPM | TEMPERATURE: 98 F | SYSTOLIC BLOOD PRESSURE: 125 MMHG

## 2024-12-29 DIAGNOSIS — F22 DELUSIONAL DISORDERS: ICD-10-CM

## 2024-12-29 LAB
ALBUMIN SERPL ELPH-MCNC: 4.5 G/DL — SIGNIFICANT CHANGE UP (ref 3.3–5)
ALP SERPL-CCNC: 98 U/L — SIGNIFICANT CHANGE UP (ref 40–120)
ALT FLD-CCNC: 38 U/L — HIGH (ref 4–33)
AMPHET UR-MCNC: NEGATIVE — SIGNIFICANT CHANGE UP
ANION GAP SERPL CALC-SCNC: 13 MMOL/L — SIGNIFICANT CHANGE UP (ref 7–14)
APAP SERPL-MCNC: <10 UG/ML — LOW (ref 15–25)
APPEARANCE UR: CLEAR — SIGNIFICANT CHANGE UP
AST SERPL-CCNC: 36 U/L — HIGH (ref 4–32)
BACTERIA # UR AUTO: NEGATIVE /HPF — SIGNIFICANT CHANGE UP
BARBITURATES UR SCN-MCNC: NEGATIVE — SIGNIFICANT CHANGE UP
BASOPHILS # BLD AUTO: 0.07 K/UL — SIGNIFICANT CHANGE UP (ref 0–0.2)
BASOPHILS NFR BLD AUTO: 1.1 % — SIGNIFICANT CHANGE UP (ref 0–2)
BENZODIAZ UR-MCNC: NEGATIVE — SIGNIFICANT CHANGE UP
BILIRUB SERPL-MCNC: 0.4 MG/DL — SIGNIFICANT CHANGE UP (ref 0.2–1.2)
BILIRUB UR-MCNC: NEGATIVE — SIGNIFICANT CHANGE UP
BUN SERPL-MCNC: 9 MG/DL — SIGNIFICANT CHANGE UP (ref 7–23)
CALCIUM SERPL-MCNC: 9.3 MG/DL — SIGNIFICANT CHANGE UP (ref 8.4–10.5)
CAST: 0 /LPF — SIGNIFICANT CHANGE UP (ref 0–4)
CHLORIDE SERPL-SCNC: 104 MMOL/L — SIGNIFICANT CHANGE UP (ref 98–107)
CO2 SERPL-SCNC: 22 MMOL/L — SIGNIFICANT CHANGE UP (ref 22–31)
COCAINE METAB.OTHER UR-MCNC: NEGATIVE — SIGNIFICANT CHANGE UP
COLOR SPEC: YELLOW — SIGNIFICANT CHANGE UP
CREAT SERPL-MCNC: 0.63 MG/DL — SIGNIFICANT CHANGE UP (ref 0.5–1.3)
CREATININE URINE RESULT, DAU: 46 MG/DL — SIGNIFICANT CHANGE UP
DIFF PNL FLD: NEGATIVE — SIGNIFICANT CHANGE UP
EGFR: 112 ML/MIN/1.73M2 — SIGNIFICANT CHANGE UP
EOSINOPHIL # BLD AUTO: 0.17 K/UL — SIGNIFICANT CHANGE UP (ref 0–0.5)
EOSINOPHIL NFR BLD AUTO: 2.6 % — SIGNIFICANT CHANGE UP (ref 0–6)
ETHANOL SERPL-MCNC: <10 MG/DL — SIGNIFICANT CHANGE UP
FENTANYL UR QL SCN: NEGATIVE — SIGNIFICANT CHANGE UP
GLUCOSE SERPL-MCNC: 120 MG/DL — HIGH (ref 70–99)
GLUCOSE UR QL: NEGATIVE MG/DL — SIGNIFICANT CHANGE UP
HCG SERPL-ACNC: <1 MIU/ML — SIGNIFICANT CHANGE UP
HCT VFR BLD CALC: 36 % — SIGNIFICANT CHANGE UP (ref 34.5–45)
HGB BLD-MCNC: 12.6 G/DL — SIGNIFICANT CHANGE UP (ref 11.5–15.5)
IANC: 3.94 K/UL — SIGNIFICANT CHANGE UP (ref 1.8–7.4)
IMM GRANULOCYTES NFR BLD AUTO: 0.3 % — SIGNIFICANT CHANGE UP (ref 0–0.9)
KETONES UR-MCNC: NEGATIVE MG/DL — SIGNIFICANT CHANGE UP
LEUKOCYTE ESTERASE UR-ACNC: NEGATIVE — SIGNIFICANT CHANGE UP
LYMPHOCYTES # BLD AUTO: 1.82 K/UL — SIGNIFICANT CHANGE UP (ref 1–3.3)
LYMPHOCYTES # BLD AUTO: 27.7 % — SIGNIFICANT CHANGE UP (ref 13–44)
MCHC RBC-ENTMCNC: 26.8 PG — LOW (ref 27–34)
MCHC RBC-ENTMCNC: 35 G/DL — SIGNIFICANT CHANGE UP (ref 32–36)
MCV RBC AUTO: 76.4 FL — LOW (ref 80–100)
METHADONE UR-MCNC: NEGATIVE — SIGNIFICANT CHANGE UP
MONOCYTES # BLD AUTO: 0.54 K/UL — SIGNIFICANT CHANGE UP (ref 0–0.9)
MONOCYTES NFR BLD AUTO: 8.2 % — SIGNIFICANT CHANGE UP (ref 2–14)
NEUTROPHILS # BLD AUTO: 3.94 K/UL — SIGNIFICANT CHANGE UP (ref 1.8–7.4)
NEUTROPHILS NFR BLD AUTO: 60.1 % — SIGNIFICANT CHANGE UP (ref 43–77)
NITRITE UR-MCNC: NEGATIVE — SIGNIFICANT CHANGE UP
NRBC # BLD: 0 /100 WBCS — SIGNIFICANT CHANGE UP (ref 0–0)
NRBC # FLD: 0 K/UL — SIGNIFICANT CHANGE UP (ref 0–0)
OPIATES UR-MCNC: NEGATIVE — SIGNIFICANT CHANGE UP
OXYCODONE UR-MCNC: NEGATIVE — SIGNIFICANT CHANGE UP
PCP SPEC-MCNC: SIGNIFICANT CHANGE UP
PCP UR-MCNC: NEGATIVE — SIGNIFICANT CHANGE UP
PH UR: 6.5 — SIGNIFICANT CHANGE UP (ref 5–8)
PLATELET # BLD AUTO: 316 K/UL — SIGNIFICANT CHANGE UP (ref 150–400)
POTASSIUM SERPL-MCNC: 3.5 MMOL/L — SIGNIFICANT CHANGE UP (ref 3.5–5.3)
POTASSIUM SERPL-SCNC: 3.5 MMOL/L — SIGNIFICANT CHANGE UP (ref 3.5–5.3)
PROT SERPL-MCNC: 7.8 G/DL — SIGNIFICANT CHANGE UP (ref 6–8.3)
PROT UR-MCNC: NEGATIVE MG/DL — SIGNIFICANT CHANGE UP
RBC # BLD: 4.71 M/UL — SIGNIFICANT CHANGE UP (ref 3.8–5.2)
RBC # FLD: 14.5 % — SIGNIFICANT CHANGE UP (ref 10.3–14.5)
RBC CASTS # UR COMP ASSIST: 0 /HPF — SIGNIFICANT CHANGE UP (ref 0–4)
SALICYLATES SERPL-MCNC: <0.3 MG/DL — LOW (ref 15–30)
SARS-COV-2 RNA SPEC QL NAA+PROBE: SIGNIFICANT CHANGE UP
SODIUM SERPL-SCNC: 139 MMOL/L — SIGNIFICANT CHANGE UP (ref 135–145)
SP GR SPEC: 1.01 — SIGNIFICANT CHANGE UP (ref 1–1.03)
SQUAMOUS # UR AUTO: 3 /HPF — SIGNIFICANT CHANGE UP (ref 0–5)
THC UR QL: NEGATIVE — SIGNIFICANT CHANGE UP
TOXICOLOGY SCREEN, DRUGS OF ABUSE, SERUM RESULT: SIGNIFICANT CHANGE UP
TSH SERPL-MCNC: 2.42 UIU/ML — SIGNIFICANT CHANGE UP (ref 0.27–4.2)
UROBILINOGEN FLD QL: 0.2 MG/DL — SIGNIFICANT CHANGE UP (ref 0.2–1)
WBC # BLD: 6.56 K/UL — SIGNIFICANT CHANGE UP (ref 3.8–10.5)
WBC # FLD AUTO: 6.56 K/UL — SIGNIFICANT CHANGE UP (ref 3.8–10.5)
WBC UR QL: 0 /HPF — SIGNIFICANT CHANGE UP (ref 0–5)

## 2024-12-29 PROCEDURE — 99285 EMERGENCY DEPT VISIT HI MDM: CPT

## 2024-12-29 RX ORDER — RISPERIDONE 0.5 MG/1
1 TABLET ORAL AT BEDTIME
Refills: 0 | Status: DISCONTINUED | OUTPATIENT
Start: 2024-12-29 | End: 2025-01-14

## 2024-12-29 RX ORDER — HALOPERIDOL DECANOATE 50 MG/ML
5 INJECTION INTRAMUSCULAR ONCE
Refills: 0 | Status: COMPLETED | OUTPATIENT
Start: 2024-12-29 | End: 2024-12-29

## 2024-12-29 RX ORDER — LORAZEPAM 1 MG/1
2 TABLET ORAL ONCE
Refills: 0 | Status: DISCONTINUED | OUTPATIENT
Start: 2024-12-29 | End: 2024-12-29

## 2024-12-29 RX ORDER — IBUPROFEN 200 MG
400 TABLET ORAL ONCE
Refills: 0 | Status: COMPLETED | OUTPATIENT
Start: 2024-12-29 | End: 2024-12-29

## 2024-12-29 RX ORDER — LORAZEPAM 1 MG/1
2 TABLET ORAL EVERY 6 HOURS
Refills: 0 | Status: DISCONTINUED | OUTPATIENT
Start: 2024-12-29 | End: 2024-12-30

## 2024-12-29 RX ADMIN — Medication 400 MILLIGRAM(S): at 21:57

## 2024-12-29 RX ADMIN — RISPERIDONE 1 MILLIGRAM(S): 0.5 TABLET ORAL at 20:17

## 2024-12-29 RX ADMIN — LORAZEPAM 2 MILLIGRAM(S): 1 TABLET ORAL at 13:22

## 2024-12-29 RX ADMIN — HALOPERIDOL DECANOATE 5 MILLIGRAM(S): 50 INJECTION INTRAMUSCULAR at 13:22

## 2024-12-29 NOTE — ED BEHAVIORAL HEALTH ASSESSMENT NOTE - DESCRIPTION
Lives with parents and 15yo son Vital Signs Last 24 Hrs  T(C): 36.9 (29 Dec 2024 07:23), Max: 36.9 (29 Dec 2024 07:23)  T(F): 98.4 (29 Dec 2024 07:23), Max: 98.4 (29 Dec 2024 07:23)  HR: 128 (29 Dec 2024 07:23) (128 - 128)  BP: 125/90 (29 Dec 2024 07:23) (125/90 - 125/90)  BP(mean): --  RR: 18 (29 Dec 2024 07:23) (18 - 18)  SpO2: 99% (29 Dec 2024 07:23) (99% - 99%)    Parameters below as of 29 Dec 2024 07:23  Patient On (Oxygen Delivery Method): room air denies

## 2024-12-29 NOTE — ED BEHAVIORAL HEALTH ASSESSMENT NOTE - SUMMARY
44 year-old with untreated psychosis for the past year, taking care of a 16 year-old son (living with grandparents), 1 previous inpatient admission on 9.27 but not treated at that time owing to refusal of meds, now presenting with worsening paranoia and irrational behavior that could put herself and her child at risk (wanting to move out of her home with son to go to a shelter 2/2 paranoid ideations). In her current condition she is a risk to herself warranting involuntary inpatient hospitalization. Differential diagnosis includes delusional disorder, late-onset schizophrenia, psychosis 2/2 phentermine abuse  Will admit 9.39  D/C phentermine   Start risperidone

## 2024-12-29 NOTE — ED ADULT NURSE NOTE - OBJECTIVE STATEMENT
Pt arrives to ED  BIBEMS for increased paranoia. This morning the patient had a familial dispute after attempting to leave the home and enter the shelter system with her son. As per EMS the family endorses that the patient has been paranoid at home, frequently staring out the window and believes people are after her. Pt presents initially oppositional, but was able to deescalated verbally and agreed to changed into  clothes, belongings secured. Pt denies ETOH or drug use, SI/HI, AH/VH. Pending further psych eval

## 2024-12-29 NOTE — ED BEHAVIORAL HEALTH ASSESSMENT NOTE - ADDITIONAL DETAILS / COMMENTS
refuses to answer most questions, very poor insight, does not believe that she has any psychiatric illness

## 2024-12-29 NOTE — ED ADULT NURSE NOTE - CHIEF COMPLAINT QUOTE
Pt arrives to ED s/p family dispute.  Pt family states pt has been exhibiting paranoia at home and told pt she wanted to leave their home and take her son to a homeless shelter.  Pt denies S/I. H/I or hallucinations..  Pt denies ETOH or drug use.  Family states no medical history.  pt was seen at [facility] for psych inpatient.  Pt is tachy in triage to 120's.  Ekg performed in triage.  Dr. Owens assessed pt in triage.  Pt family contact number [phone]   Pt son is 16 and his phone is [phone].

## 2024-12-29 NOTE — BH PATIENT PROFILE - NSVRISKLVLREC_PSY_ALL_CORE
ORAL CHEMOTHERAPY DISCONTINUATION       Primary Oncologist:  Dr. Vitor Trent  Primary Oncology Clinic: [facility]  Cancer Diagnosis:  Metastatic Uveal and Choroidal Carcinoma  Therapy History:  Sutinib 25 mg daily  C1D1=3/31/2020  Therapy Ended On:  8/28/2020  Reason For Discontinuation: disease progression    Additional Notes:  Patient has experienced disease progression and will move to radiation therapy. Thank you for the opportunity to be a part of this patient's oral chemotherapy. The oncology pharmacy will no longer be following this patient for oral chemotherapy changes. Feel free to contact us.    Ro Van  Pharmacy Intern  [facility]  [phone]         Minimal (Score 0-3)

## 2024-12-29 NOTE — ED ADULT NURSE NOTE - NS_NURSE_BED_LOCATION_ED_ALL_ED_FT
Was the patient seen in the last year in this department? Yes    Does patient have an active prescription for medications requested? No     Received Request Via: Pharmacy  
Low 3

## 2024-12-29 NOTE — ED PROVIDER NOTE - OBJECTIVE STATEMENT
45 y/o female bibems for paranoia.  Per ems, pt's family called 911 d/t pt's increasing paranoia over past 24 hours.  Not sleeping.  No drug or etoh use.  Pt accusing family of persecution and threatening to take her son to a shelter.  Pt states she does not need to be in the ED.  Denies si/hi, a/v halluc.  States she wants to leave her house but will not specify why.  States she does not have a psychiatrist, not on any psych meds.

## 2024-12-29 NOTE — ED PROVIDER NOTE - CLINICAL SUMMARY MEDICAL DECISION MAKING FREE TEXT BOX
45 y/o female bibems for paranoia.  Review of prior visits reveals i/p psych stay at [facility] for similar presentation.  Will obtain ekg cbc cmp tsh tox scrn hcg.  Discussed pt with  attg, will consult on pt after labwork results.  Medically clear for psych eval.

## 2024-12-29 NOTE — ED BEHAVIORAL HEALTH ASSESSMENT NOTE - HPI (INCLUDE ILLNESS QUALITY, SEVERITY, DURATION, TIMING, CONTEXT, MODIFYING FACTORS, ASSOCIATED SIGNS AND SYMPTOMS)
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 Medical hx significant for obesity s/p gastric bypass 2011, currently taking phentermine prescribed by PCP. No formal psychiatric history, no previous hospitalizations, not currently in treatment with psychiatry or therapy, not taking any psychiatric medications. No hx of substance abuse or rehabs. Patient has recently made 5 police reports, emailed FBI and . No current legal issues. Patient presents to [facility] by police for aggression and physical altercatio with family. BAL on presentation was 140. Patient attempted to elope from ED and was given IM PRNs. Transferred to [facility] and admitted 2pc.       She took a leave from work on 11/29/23 due to being unable to function and concentrate at her job (she expects to go back later in January). She feels that a special light was shining on her at work that allowed people to see her in the building. She denies changes in sleep, but states that she was taking wegovy but stopped when she started losing a lot of weight as she felt more stressed due to what was going on and attributed that to the weight loss. She has been consistent with phentermine.     Denies drug/etoh abuse, +nicotine use.    Denies ish sx. Denies paranoia, denies paranoia about [place], denies IoR, special layne, people listening into her, mind racing, confused thoughts, AVH. Denies SI/HI. States she tried to leave the hospital because she wants to be more comfortable.      Per ED note: “Spoke with pts brother Modesto Jones [phone] who states family was celebrating for new years holiday. Around 4 pm pt was intoxicated and became aggressive. Brother took family out of house and called . When they arrived an hour later pt was calmer and pts mother came home and stated she was closer to baseline and no arrests were made and pt not brought to hospital. At 7 pm pt became verbally aggressive with mother and 16 year old son and  were called again and pt brought to ED. Although etoh on board, brother states last 3-4 months family concerned that pt having paranoid MD Fortunato: persecutory delusions. States people are following her and listening to her conversations. States that planes flying overhead from [place] are taking photographs of her. Has not been psych evaluated. No known hx of psych.      Pt now awake from sedation and axoxo3. Will call psych.”   ISTOP Reference #: [id number]   --Regular Phentermine 37.5mg  prescription      BH attempted to contact family at above number but appears disconnected. 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 Medical hx significant for obesity s/p gastric bypass 2011, currently taking phentermine prescribed by PCP 37.5 mg fow weight loss. Patient  has a history of 1 previous psychiatric hospitalization on 9.27 at [facility] for paranoia, agitated behavior, was held for several weeks but refused antipsychotic and was discharged, did not follow-up. Patient at that time had made 5 police reports, emailed FBI and . Patient has remained home in [place] with mother and 16 year-old son over the past year, spending most of her time at home while son is in school, not working. She has gotten increasingly paranoid, believing that people she is unable to identify are watching her and following her. She believes that they are doing something in the home, and she has heard them making noise in the house, she unplugs the phone, turns off the heat as she believes something will happen. She has refused any treatment for this. According to her mother, yesterday she want to her sister-in-law's house and had an argument, although the details are not clear. She came home and this morning told her mother that she was leaving the house with her 16 year-old son and was going to a "a shelter." Mother refused, as this is clearly not safe plan. Patient called 911 herself as she wanted to leave with her son, was taken to the ED via EMS. Most of the history provided by mother, patient is very guarded, refusing to say anything other than "I want to go home, I can take my son where ever I want, I am not staying in that house." She is irritable and guarded, largely refusing exam. No substance misuse, no legal history, no suicide attempts, no hallucinations, no major mood symptoms.         ISTOP Reference #: [id number]   --Regular Phentermine 37.5mg  prescription      BH attempted to contact family at above number but appears disconnected.

## 2024-12-29 NOTE — ED BEHAVIORAL HEALTH ASSESSMENT NOTE - CURRENT MEDICATION
MEDICATIONS  (STANDING):  melatonin. 3 milliGRAM(s) Oral at bedtime  multivitamin 1 Tablet(s) Oral daily    MEDICATIONS  (PRN):  acetaminophen     Tablet .. 650 milliGRAM(s) Oral every 6 hours PRN Temp greater or equal to 38.5C (101.3F) (1st line), Mild Pain (1 - 3)  aluminum hydroxide/magnesium hydroxide/simethicone Suspension 30 milliLiter(s) Oral every 6 hours PRN Dyspepsia  diphenhydrAMINE 50 milliGRAM(s) Oral every 6 hours PRN Anxiety and Insomnia  haloperidol     Tablet 5 milliGRAM(s) Oral every 6 hours PRN aggression  ibuprofen  Tablet. 400 milliGRAM(s) Oral every 6 hours PRN Temp greater or equal to 38C (100.4F) (2nd line), Moderate Pain (4 - 6)  LORazepam     Tablet 2 milliGRAM(s) Oral every 6 hours PRN agitation   phentermine 37.5 mg

## 2024-12-29 NOTE — ED BEHAVIORAL HEALTH ASSESSMENT NOTE - INTERRUPTED ATTEMPT:
Include Location In Plan?: No Additional Note: Advised that lesions are benign in nature and require no treatment if asymptomatic. Detail Level: Generalized None known

## 2024-12-29 NOTE — ED PROVIDER NOTE - NS ED MD DISPO DIVISION
Medardo called concern labs got drawn today however concern labs was missing the per usual monthly orders and wants to make sure they are processed before his provider appt with Nedra Vick tomorrow 8/19.       PSA  Testosterone  And the other tests that are done monthly but per pt the [facility] lab did not have the orders.       As of 2:41pm Labs have been ordered and process.    [facility]

## 2024-12-29 NOTE — ED ADULT NURSE NOTE - NSFALLUNIVINTERV_ED_ALL_ED
Bed/Stretcher in lowest position, wheels locked, appropriate side rails in place/Call bell, personal items and telephone in reach/Instruct patient to call for assistance before getting out of bed/chair/stretcher/Non-slip footwear applied when patient is off stretcher/[place] to call system/Physically safe environment - no spills, clutter or unnecessary equipment/Purposeful proactive rounding/Room/bathroom lighting operational, light cord in reach

## 2024-12-29 NOTE — ED BEHAVIORAL HEALTH ASSESSMENT NOTE - OTHER PAST PSYCHIATRIC HISTORY (INCLUDE DETAILS REGARDING ONSET, COURSE OF ILLNESS, INPATIENT/OUTPATIENT TREATMENT)
Previous Dx: denies     Previous Med Trials: denies     Previous IP treatment:denies     Previous SA: denies     Self harming: denies     Current MH treatment: denies     Violence/Legal: denies has refused meds in the past, 1 previous admission as above        Self harming: denies     Current MH treatment: denies     Violence/Legal: denies

## 2024-12-29 NOTE — ED ADULT NURSE REASSESSMENT NOTE - NS ED NURSE REASSESS COMMENT FT1
8:30am: Received report from PM RN. Patient is alert and oriented times three. Patient is resting at this time. Evaluated by psychiatry. Labs drawn and sent. Waiting for disposition.  ANNABELLE Desouza

## 2024-12-29 NOTE — ED ADULT TRIAGE NOTE - CHIEF COMPLAINT QUOTE
Pt arrives to ED s/p family dispute.  Pt family states pt has been exhibiting paranoia at home and told pt she wanted to leave their home and take her son to a homeless shelter.  Pt denies S/I. H/I or hallucinations..  Pt denies ETOH or drug use.  Family states no medical or psych history.  Pt is tachy in triage to 120's. Pt arrives to ED s/p family dispute.  Pt family states pt has been exhibiting paranoia at home and told pt she wanted to leave their home and take her son to a homeless shelter.  Pt denies S/I. H/I or hallucinations..  Pt denies ETOH or drug use.  Family states no medical or psych history.  Pt is tachy in triage to 120's.  Grace Owens assessed pt in triage.  Pt family contact number [phone] Pt arrives to ED s/p family dispute.  Pt family states pt has been exhibiting paranoia at home and told pt she wanted to leave their home and take her son to a homeless shelter.  Pt denies S/I. H/I or hallucinations..  Pt denies ETOH or drug use.  Family states no medical or psych history.  Pt is tachy in triage to 120's.  Ekg performed in triage.  Dr. Owens assessed pt in triage.  Pt family contact number [phone]   Pt son is 16 and his phone is [phone]. Pt arrives to ED s/p family dispute.  Pt family states pt has been exhibiting paranoia at home and told pt she wanted to leave their home and take her son to a homeless shelter.  Pt denies S/I. H/I or hallucinations..  Pt denies ETOH or drug use.  Family states no medical history.  pt was seen at [facility] for psych inpatient.  Pt is tachy in triage to 120's.  Ekg performed in triage.  Dr. Owens assessed pt in triage.  Pt family contact number [phone]   Pt son is 16 and his phone is [phone].

## 2024-12-29 NOTE — ED BEHAVIORAL HEALTH ASSESSMENT NOTE - NSBHATTESTBILLING_PSY_A_CORE
99223-Initial OBS or IP - high complexity OR  mins 99283-Emergency Department visit - low complexity

## 2024-12-30 PROCEDURE — 99223 1ST HOSP IP/OBS HIGH 75: CPT | Mod: GC

## 2024-12-30 RX ORDER — HALOPERIDOL DECANOATE 50 MG/ML
5 INJECTION INTRAMUSCULAR EVERY 6 HOURS
Refills: 0 | Status: DISCONTINUED | OUTPATIENT
Start: 2024-12-30 | End: 2025-01-14

## 2024-12-30 RX ORDER — ACETAMINOPHEN 80 MG/.8ML
650 SOLUTION/ DROPS ORAL EVERY 6 HOURS
Refills: 0 | Status: DISCONTINUED | OUTPATIENT
Start: 2024-12-30 | End: 2025-01-07

## 2024-12-30 RX ORDER — GINKGO BILOBA 40 MG
3 CAPSULE ORAL AT BEDTIME
Refills: 0 | Status: DISCONTINUED | OUTPATIENT
Start: 2024-12-30 | End: 2025-01-14

## 2024-12-30 RX ORDER — IBUPROFEN 200 MG
400 TABLET ORAL ONCE
Refills: 0 | Status: COMPLETED | OUTPATIENT
Start: 2024-12-30 | End: 2024-12-30

## 2024-12-30 RX ORDER — HALOPERIDOL DECANOATE 50 MG/ML
5 INJECTION INTRAMUSCULAR ONCE
Refills: 0 | Status: DISCONTINUED | OUTPATIENT
Start: 2024-12-30 | End: 2025-01-14

## 2024-12-30 RX ORDER — LORAZEPAM 1 MG/1
2 TABLET ORAL ONCE
Refills: 0 | Status: DISCONTINUED | OUTPATIENT
Start: 2024-12-30 | End: 2025-01-06

## 2024-12-30 RX ORDER — B COMPLEX, C NO.20/FOLIC ACID 1 MG
1 CAPSULE ORAL DAILY
Refills: 0 | Status: DISCONTINUED | OUTPATIENT
Start: 2024-12-30 | End: 2025-01-03

## 2024-12-30 RX ORDER — LORAZEPAM 1 MG/1
2 TABLET ORAL EVERY 6 HOURS
Refills: 0 | Status: DISCONTINUED | OUTPATIENT
Start: 2024-12-30 | End: 2024-12-30

## 2024-12-30 RX ADMIN — Medication 400 MILLIGRAM(S): at 06:49

## 2024-12-30 NOTE — BH INPATIENT PSYCHIATRY ASSESSMENT NOTE - NSICDXBHPRIMARYDX_PSY_ALL_CORE
Schizophrenia spectrum disorder with psychotic disorder type not yet determined   F29   Psychosis   F29

## 2024-12-30 NOTE — BH INPATIENT PSYCHIATRY ASSESSMENT NOTE - NSBHATTESTBILLING_PSY_A_CORE
99223-Initial OBS or IP - high complexity OR  mins Bactrim Pregnancy And Lactation Text: This medication is Pregnancy Category D and is known to cause fetal risk.  It is also excreted in breast milk.

## 2024-12-30 NOTE — BH INPATIENT PSYCHIATRY ASSESSMENT NOTE - HPI (INCLUDE ILLNESS QUALITY, SEVERITY, DURATION, TIMING, CONTEXT, MODIFYING FACTORS, ASSOCIATED SIGNS AND SYMPTOMS)
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 Medical hx significant for obesity s/p gastric bypass 2011, taking phentermine prescribed by PCP 37.5 mg for weight loss.    Per ED provider note, " Patient  has a history of 1 previous psychiatric hospitalization on 9.27 at [facility] for paranoia, agitated behavior, was held for several weeks but refused antipsychotic and was discharged, did not follow-up. Patient at that time had made 5 police reports, emailed FBI and . Patient has remained home in [place] with mother and 16 year-old son over the past year, spending most of her time at home while son is in school, not working. She has gotten increasingly paranoid, believing that people she is unable to identify are watching her and following her. She believes that they are doing something in the home, and she has heard them making noise in the house, she unplugs the phone, turns off the heat as she believes something will happen. She has refused any treatment for this. According to her mother, yesterday she want to her sister-in-law's house and had an argument, although the details are not clear. She came home and this morning told her mother that she was leaving the house with her 16 year-old son and was going to a "a shelter." Mother refused, as this is clearly not safe plan. Patient called 911 herself as she wanted to leave with her son, was taken to the ED via EMS. Most of the history provided by mother, patient is very guarded, refusing to say anything other than "I want to go home, I can take my son where ever I want, I am not staying in that house." She is irritable and guarded, largely refusing exam. No substance misuse, no legal history, no suicide attempts, no hallucinations, no major mood symptoms."    On evaluation today, pt is calm and cooperative. Pt reports that she is unsure why she has been admitted.  States that she was attempting to leave her mother's house with her son so that they can move into a shelter. Unclear why the patient needed to leave the house so urgently. Pt reports that he mother did not want her to take the son with her to a shelter. Pt then called the police to "settle the dispute". It was difficult at first to follow the patient's story (potential reasons are broad and could be due to language barrier, thought dysfunction or rate at which patient was telling the story due to excitement) but pt became clearer as the interview went on. Pt is the primary caretaker for her sick father and lives at home with her father, mother and her 15 y/o son. Pt reports being overwhelmed with her workload at home and threatened to leave and go to shelter as a way to "pressure" her mother to do more work related to her fathers' care. Pt reports that she has been attempting to move out of her parent's house for months but has been unable due to her workload. Reports that if she had an apartment or somewhere safe to go on the night of the incident she would have gone but does not believe she actually intended to move to a shelter. Pt denies paranoia, persecutory delusions or delusions of reference. States that during her first psychiatric hospitalization 1 year ago at [facility], she was there because she was drinking to much, not eating and saying delusional things. States that this is not the case now as she has not had a drink since then. States that she has not been delusional, paranoid or aggressive since arriving at the ED although it was unclear to the treatment team why she was brought to the ED or why she received IM injections for agitation. When asked, pt states that the staff told her "everyone gets these medications". Denies symptoms of depression, anxiety or ish. Denies drug use. Believes that this was all a misunderstanding and would like to go home.    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 Medical hx significant for obesity s/p gastric bypass 2011, taking phentermine prescribed by PCP 37.5 mg for weight loss.    Per ED provider note, " Patient  has a history of 1 previous psychiatric hospitalization on 9.27 at [facility] for paranoia, agitated behavior, was held for several weeks but refused antipsychotic and was discharged, did not follow-up. Patient at that time had made 5 police reports, emailed FBI and . Patient has remained home in [place] with mother and 16 year-old son over the past year, spending most of her time at home while son is in school, not working. She has gotten increasingly paranoid, believing that people she is unable to identify are watching her and following her. She believes that they are doing something in the home, and she has heard them making noise in the house, she unplugs the phone, turns off the heat as she believes something will happen. She has refused any treatment for this. According to her mother, yesterday she want to her sister-in-law's house and had an argument, although the details are not clear. She came home and this morning told her mother that she was leaving the house with her 16 year-old son and was going to a "a shelter." Mother refused, as this is clearly not safe plan. Patient called 911 herself as she wanted to leave with her son, was taken to the ED via EMS. Most of the history provided by mother, patient is very guarded, refusing to say anything other than "I want to go home, I can take my son where ever I want, I am not staying in that house." She is irritable and guarded, largely refusing exam. No substance misuse, no legal history, no suicide attempts, no hallucinations, no major mood symptoms."    On evaluation today, pt is calm and cooperative. Pt reports that she is unsure why she has been admitted.  States that she was attempting to leave her mother's house with her son so that they can move into a shelter. Unclear why the patient needed to leave the house so urgently. Pt reports that he mother did not want her to take the son with her to a shelter. Pt then called the police to "settle the dispute". It was difficult at first to follow the patient's story (potential reasons are broad and could be due to language barrier, thought dysfunction or rate at which patient was telling the story due to excitement) but pt became clearer as the interview went on. 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 Pt reports being overwhelmed with her workload at home and threatened to leave and go to shelter as a way to "pressure" her mother to do more work related to her fathers' care. Pt reports that she has been attempting to move out of her parent's house for months but has been unable due to her workload. Reports that if she had an apartment or somewhere safe to go on the night of the incident she would have gone but does not believe she actually intended to move to a shelter. Pt denies paranoia, persecutory delusions or delusions of reference. States that during her first psychiatric hospitalization 1 year ago at [facility], she was there because she was drinking to much, not eating and saying delusional things. States that this is not the case now as she has not had a drink since then. States that she has not been delusional, paranoid or aggressive since arriving at the ED although it was unclear to the treatment team why she was brought to the ED or why she received IM injections for agitation. When asked, pt states that the staff told her "everyone gets these medications". Denies symptoms of depression, anxiety or ish. Denies drug use. Believes that this was all a misunderstanding and would like to go home.       Collateral information from Karen's mother: Karen was acting "crazy", pacing, cursing at 1am last night and that's what led to the  being called. Police noted her disorganized behavior. Pt believes that someone is doing something to their home, and she has heard them making noise in the house, she unplugs the phone, turns off the heat as she believes something will happen. Spoke about giving away her son at the shelter. This paranoid state has been happening on and off since last year but yesterday was the worst she has been. She will take walks at night and believes someone is following her, thinks that she hears clicking on the phone that indicates that people are listening in on her conversations. Mother states that she was completely fine and high functioning up until last year.   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 Medical hx significant for obesity s/p gastric bypass 2011, taking phentermine prescribed by PCP 37.5 mg for weight loss.    Per ED provider note, " Patient  has a history of 1 previous psychiatric hospitalization on 9.27 at [facility] for paranoia, agitated behavior, was held for several weeks but refused antipsychotic and was discharged, did not follow-up. Patient at that time had made 5 police reports, emailed FBI and . Patient has remained home in [place] with mother and 16 year-old son over the past year, spending most of her time at home while son is in school, not working. She has gotten increasingly paranoid, believing that people she is unable to identify are watching her and following her. She believes that they are doing something in the home, and she has heard them making noise in the house, she unplugs the phone, turns off the heat as she believes something will happen. She has refused any treatment for this. According to her mother, yesterday she want to her sister-in-law's house and had an argument, although the details are not clear. She came home and this morning told her mother that she was leaving the house with her 16 year-old son and was going to a "a shelter." Mother refused, as this is clearly not safe plan. Patient called 911 herself as she wanted to leave with her son, was taken to the ED via EMS. Most of the history provided by mother, patient is very guarded, refusing to say anything other than "I want to go home, I can take my son where ever I want, I am not staying in that house." She is irritable and guarded, largely refusing exam. No substance misuse, no legal history, no suicide attempts, no hallucinations, no major mood symptoms."    On evaluation today, pt is calm and cooperative. Pt reports that she is unsure why she has been admitted.  States that she was attempting to leave her mother's house with her son so that they can move into a shelter. Unclear why the patient needed to leave the house so urgently. Pt reports that he mother did not want her to take the son with her to a shelter. Pt then called the police to "settle the dispute". It was difficult at first to follow the patient's story (potential reasons are broad and could be due to language barrier, thought dysfunction or rate at which patient was telling the story due to excitement) but pt became clearer as the interview went on. 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 Pt reports being overwhelmed with her workload at home and threatened to leave and go to shelter as a way to "pressure" her mother to do more work related to her fathers' care. Pt reports that she has been attempting to move out of her parent's house for months but has been unable due to her workload. Reports that if she had an apartment or somewhere safe to go on the night of the incident she would have gone but does not believe she actually intended to move to a shelter. Pt denies paranoia, persecutory delusions or delusions of reference. States that during her first psychiatric hospitalization 1 year ago at [facility], she was there because she was drinking to much, not eating and saying delusional things. States that this is not the case now as she has not had a drink since then. States that she has not been delusional, paranoid or aggressive since arriving at the ED although it was unclear to the treatment team why she was brought to the ED or why she received IM injections for agitation. When asked, pt states that the staff told her "everyone gets these medications". Denies symptoms of depression, anxiety or ish. Denies drug use. Believes that this was all a misunderstanding and would like to go home.       Collateral information from Karen's mother: Karen was acting "crazy", pacing, cursing at 1am last night and that's what led to the  being called. Police noted her disorganized behavior. Pt believes that someone is doing something to their home, and she has heard them making noise in the house, she unplugs the phone, turns off the heat as she believes something will happen. Spoke about giving away her son at the shelter. This paranoid state has been happening on and off since last year but yesterday was the worst she has been. She will take walks at night and believes someone is following her, thinks that she hears clicking on the phone that indicates that people are listening in on her conversations. Mother states that she was completely fine and high functioning up until last year.   Patient is a 45 yo F, currently domiciled with family (parents, 16 year old son), former mental health worker, [facility] of obesity s/p gastric bypass in 2011 (on phentermine), PPHx of new onset psychosis with one related prior psychiatric hospitalization ([facility] in 1/2024), who was BIB police after trying to take her 15 yo son out of the home and to a shelter     Per ED provider note, " Patient  has a history of 1 previous psychiatric hospitalization on 9.27 at [facility] for paranoia, agitated behavior, was held for several weeks but refused antipsychotic and was discharged, did not follow-up. Patient at that time had made 5 police reports, emailed FBI and . Patient has remained home in [place] with mother and 16 year-old son over the past year, spending most of her time at home while son is in school, not working. She has gotten increasingly paranoid, believing that people she is unable to identify are watching her and following her. She believes that they are doing something in the home, and she has heard them making noise in the house, she unplugs the phone, turns off the heat as she believes something will happen. She has refused any treatment for this. According to her mother, yesterday she want to her sister-in-law's house and had an argument, although the details are not clear. She came home and this morning told her mother that she was leaving the house with her 16 year-old son and was going to a "a shelter." Mother refused, as this is clearly not safe plan. Patient called 911 herself as she wanted to leave with her son, was taken to the ED via EMS. Most of the history provided by mother, patient is very guarded, refusing to say anything other than "I want to go home, I can take my son where ever I want, I am not staying in that house." She is irritable and guarded, largely refusing exam. No substance misuse, no legal history, no suicide attempts, no hallucinations, no major mood symptoms."    On evaluation today, pt is calm and cooperative. Pt reports that she is unsure why she has been admitted.  States that she was attempting to leave her mother's house with her son so that they can move into a shelter. Unclear why the patient needed to leave the house so urgently. Pt reports that he mother did not want her to take the son with her to a shelter. Pt then called the police to "settle the dispute". It was difficult at first to follow the patient's story (potential reasons are broad and could be due to language barrier, thought disorder or rate at which patient was telling the story due to excitement) but pt became clearer as the interview went on. 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 Pt reports being overwhelmed with her workload at home and threatened to leave and go to shelter as a way to "pressure" her mother to do more work related to her fathers' care. Pt reports that she has been attempting to move out of her parent's house for months but has been unable due to her workload. Reports that if she had an apartment or somewhere safe to go on the night of the incident she would have gone but does not believe she actually intended to move to a shelter. States that during her first psychiatric hospitalization 1 year ago at [facility], she was there because she was drinking to much, not eating and saying paranoid things. States that this is not the case now as she has not had a drink since then. States that she has not been delusional, paranoid or aggressive since arriving at the ED although it was unclear to the treatment team why she was brought to the ED or why she received IM injections for agitation. When asked about reason for IMs, pt states that the staff told her "everyone gets these medications". Denies symptoms of depression, anxiety or ish. Denies drug use. Believes that this was all a misunderstanding and would like to go home.       Collateral information from pt's mother: states pt was acting "crazy", pacing, cursing at 1am last night and that's what led to the  being called. Police noted her disorganized behavior. Pt believes that someone is doing something to their home, and she has heard them making noise in the house, she unplugs the phone, turns off the heat as she believes something will happen. Spoke about giving away her son at the shelter. This paranoid state has been happening on and off since last year but yesterday was the worst she has been. She will take walks at night and believes someone is following her, thinks that she hears clicking on the phone that indicates that people are listening in on her conversations. Mother states that she was completely fine and high functioning up until last year.

## 2024-12-30 NOTE — PSYCHIATRIC REHAB INITIAL EVALUATION - NSBHEMPLOYED_PSY_ALL_CORE
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

## 2024-12-30 NOTE — BH INPATIENT PSYCHIATRY ASSESSMENT NOTE - OTHER PAST PSYCHIATRIC HISTORY (INCLUDE DETAILS REGARDING ONSET, COURSE OF ILLNESS, INPATIENT/OUTPATIENT TREATMENT)
has refused meds in the past, 1 previous admission as above        Self harming: denies     Current MH treatment: denies     Violence/Legal: denies has refused meds in the past, 1 previous admission as above    Self harming: denies     Current MH treatment: denies     Violence/Legal: denies has refused meds in the past, 1 previous admission as noted in HPI (at [facility] in 1/2024 for alcohol and paranoia)    Self harming: denies     Current  treatment: denies     Violence/Legal: denies

## 2024-12-30 NOTE — PSYCHIATRIC REHAB INITIAL EVALUATION - NSBHPRRECOMMEND_PSY_ALL_CORE
Writer met with patient in order to orient patient to the unit, introduce patient to Psychiatric Rehabilitation staff, department functions and to a establish a collaborative Psychiatric Rehabilitation goal. Patient was verbal and polite throughout the duration of the interview. Patient was able to be fully oriented to group programming and was encouraged to attend. Questions and concerns surrounding treatment and Psychiatric Rehabilitation services were addressed. Patient was admitted to [facility], unit ML3 on 12/29/24 in the context of paranoia and believing that people are watching and following her. Patient has a PPHx of one previous admission for paranoia and agitated behavior. Patient has a PMHx of obesity, s/p gastric bypass 2011. Patient was able to develop a rapport with writer and expresses interest in receiving treatment. Patient was unable able to establish a collaborative Psychiatric Rehabilitation goal, so one was created for her. Psychiatric Rehabilitation Staff will continue to engage patient daily in order to develop rapport, provide support and to assist patient in demonstrating progress towards Psychiatric Rehabilitation goals.

## 2024-12-30 NOTE — BH INPATIENT PSYCHIATRY ASSESSMENT NOTE - MSE UNSTRUCTURED FT
Appearance: grooming-intact, wearing surgical bonnet and hospital gown on the unit   Behavior: cooperative, no PMA/PMR  Speech: regular rate, rhythm and volume  Mood: "Fine"  Affect: euthymic to slightly irritable when told that she would not be discharged today   Thought process: linear, mostly organized. Some element of illogical thought process (unable to give logical reasoning for decisions made such as attempting to leave with the house with son at 1am, unable to reason through the events that led to her current or previous hospitalizations)   Thought content: no delusions elicited on interview but per mother she exhibits evidence of persecutory delusions at home (thinks people are following her around, believes people are listening to her phone calls, thinks that someone is out to get her and destroy her house)  Perceptions: no AH, VH elicited on interview but per mother, pt does hear people in the house who aren't there and claims to hear clicking on her phone calls  Insight: poor   Judgement: poor   Cognition: grossly intact   Gait: intact Appearance: grooming-intact, wearing surgical bonnet and hospital gown on the unit   Behavior: cooperative, fairly calm though gets more activated as interview continues, no PMA/PMR  Speech: regular rate, rhythm and volume mostly, could be rapid at times  Mood: "Fine"  Affect: increasing irritability during interview  Thought process: coherent, largely linear, though could be difficult to follow at times with some element of illogical thought process (unable to give logical reasoning for decisions made such as attempting to leave the house with son at 1am, unable to reason through the events that led to her current or previous hospitalizations)  Thought content: no delusions elicited on interview but per mother she exhibits evidence of persecutory delusions at home (thinks people are following her around, believes people are listening to her phone calls, thinks that someone is out to get her and destroy her house)  Perceptions: no AH, VH elicited on interview but per mother, pt does hear people in the house who aren't there and claims to hear clicking on her phone calls  Insight: poor   Judgement: poor   Cognition: grossly intact   Gait: intact

## 2024-12-30 NOTE — PSYCHIATRIC REHAB INITIAL EVALUATION - NSBHLOCATIONHOME_PSY_ALL_CORE_FT
private residence Graft Donor Site Bandage (Optional-Leave Blank If You Don't Want In Note): Steri-strips and a pressure bandage were applied to the donor site.

## 2024-12-30 NOTE — BH INPATIENT PSYCHIATRY ASSESSMENT NOTE - NSBHASSESSSUMMFT_PSY_ALL_CORE
One liner identifying statement/BIB * for *.    (IF admission initial intake presenation +) Working diagnosis    Currently, the patient is * (one sentence assessment of continued symptoms and symptom improvement)    Continued inpatient admission required for * (safety, stabilization, medication optimization, safe discharge planning)      Plan:  1. Legal: Admitted on ML3 continue 9.39 status  2. Safety: No reported SI/SIB/HI/VI currently on unit; continue routine observation.  -Haldol/Ativan PRN medications for safety/agitation  3. Psychiatric:  -Risperdal 1mg PO QHS; titrate until effect and tolerability; R/B/A and side effects discussed  4. Therapy: individual therapy focused on group & milieu therapy  5. Medical: H/o gastric bypass. Start Multivitamin. Previously on phentermine for weight loss, discontinued in the ED. Will keep medication discontinued to due known potential side effect of paranoia and psychosis   6. Collateral: Collateral from Mother, Marleni Jones [phone]  7. 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    On assessment, pt appeared calm and cooperative, denied any psychiatric issue and did not appear disorganized or psychotic. According to mother, pt is  intermittently extremely disorganized, paranoid with persecutory delusions, rambling and illogical with this most recent episode being the worst. Pt started becoming increasingly more paranoid and delusional since her first psychiatric admission in January of this year. On this first psychiatric admission at [facility] pt was diagnosed with psychosis and refused medications during this stay due to belief that she does not have a psychiatric issue. Pt has no insight into her current condition. Though she is mostly linear, she has an illogical nature to her thought process as she can't seem to explain the circumstances leading to her psychiatric admissions in a way that is clear and concise. Working diagnosis at this is a schizophrenia- like illness, likely a late onset schizophrenia given her age vs phentermine induced psychosis (known side effect) vs primary delusional disorder. Paranoid personality disorder was on the differential but ruled out at this time due to the reported onset of symptoms. Will inquire more about the nature and timing of her symptoms tomorrow. Will continue Risperdal. Given that patient has had a gastric bypass, she is at risk for vitamin deficiency. Will add on a daily multivitamin for general health. Continued inpatient admission required for safety and stabilization.    Plan:  1. Legal: Admitted on ML3 continue 9.39 status  2. Safety: No reported SI/SIB/HI/VI currently on unit; continue routine observation.  -Haldol/Ativan PRN medications for safety/agitation  3. Psychiatric:  -Risperdal 1mg PO QHS; titrate until effect and tolerability; R/B/A and side effects discussed  4. Therapy: individual therapy focused on group & milieu therapy  5. Medical: H/o gastric bypass. Start Multivitamin. Previously on phentermine for weight loss, discontinued in the ED. Will keep medication discontinued to due known potential side effect of paranoia and psychosis   6. Collateral: Collateral from Mother, Marleni Jones [phone]  7. Disposition: When stable/pending clinical improvement    Patient is a 43 yo F, currently domiciled with family (parents, 16 year old son), former mental health worker, PM of obesity s/p gastric bypass in 2011 (on phentermine), PPHx of new onset psychosis with one related prior psychiatric hospitalization ([facility] in 1/2024), who was BIB police after trying to take her 15 yo son out of the home and to a shelter     This is a patient with little formal psychiatric history, besides one psychiatric hospitalization a year ago for paranoia and alcohol intoxication at [facility]. She has not followed up with care or taken medications since then. Per collateral obtained from mother, patient had been at high functioning baseline prior to one year ago, when she became acutely paranoid, with related suspicious behaviors/statements (unplugging devices, believing she is being followed etc), culminating in agitation and desire to remove son from home to take to a shelter, leading to this ED presentation. Patient required IM medications in the ED. On evaluation on ML3, patient is calm and cooperative, though becomes increasingly irritable and defensive over interview, is overall coherent though with notable illogical reasoning that is at times difficult to follow.     Working diagnosis at this is a late-onset schizophrenia-spectrum illness (given wide ranging paranoia, some thought disorder) vs primary delusional disorder (given lack of prominent other positive symptoms) vs phentermine induced psychosis (reported SE, but appears less likely). Will continue Risperdal, continue to monitor. Will obtain FEP labs.     Plan:  1. Legal: Admitted on ML3 continue 9.39 status  2. Safety: No reported SI/SIB/HI/VI currently on unit; continue routine observation.  -Haldol/Ativan PRN medications for safety/agitation  3. Psychiatric:  -Risperdal 1mg PO QHS; titrate until effect and tolerability; R/B/A and side effects discussed  	-Metabolic labs ordered  4. Therapy: group & milieu therapy  5. Medical: H/o gastric bypass. Start Multivitamin. Previously on phentermine for weight loss, discontinued in the ED . Will keep medication discontinued to due known potential side effect of paranoia and psychosis   -Add FEP labs (RPR, Vit B12, folate, ABAD, ceruloplasmin, ESR)  6. Collateral: Collateral from Mother, Marleni Jones [phone]   7. Disposition: When stable/pending clinical improvement

## 2024-12-30 NOTE — BH INPATIENT PSYCHIATRY ASSESSMENT NOTE - NSBHCHARTREVIEWVS_PSY_A_CORE FT
Vital Signs Last 24 Hrs  T(C): 36.7 (12-30-24 @ 08:27), Max: 36.7 (12-30-24 @ 08:27)  T(F): 98 (12-30-24 @ 08:27), Max: 98 (12-30-24 @ 08:27)  HR: --  BP: --  BP(mean): --  RR: 18 (12-30-24 @ 08:27) (18 - 18)  SpO2: --    Orthostatic VS  12-30-24 @ 08:27  Lying BP: --/-- HR: --  Sitting BP: 107/58 HR: 95  Standing BP: 105/73 HR: 104  Site: --  Mode: --  Orthostatic VS  12-29-24 @ 19:36  Lying BP: --/-- HR: --  Sitting BP: 112/77 HR: 102  Standing BP: 99/82 HR: 117  Site: --  Mode: --  Orthostatic VS  12-29-24 @ 13:58  Lying BP: --/-- HR: --  Sitting BP: 99/69 HR: 98  Standing BP: 103/91 HR: 103  Site: --  Mode: --

## 2024-12-30 NOTE — BH INPATIENT PSYCHIATRY ASSESSMENT NOTE - NSBHMETABOLIC_PSY_ALL_CORE_FT
BMI: BMI (kg/m2): 34.6 (12-29-24 @ 13:58)  HbA1c: A1C with Estimated Average Glucose Result: 4.9 % (01-03-24 @ 05:30)    Glucose:   BP: 117/79 (12-29-24 @ 12:56) (117/79 - 125/90)Vital Signs Last 24 Hrs  T(C): 36.7 (12-30-24 @ 08:27), Max: 36.7 (12-30-24 @ 08:27)  T(F): 98 (12-30-24 @ 08:27), Max: 98 (12-30-24 @ 08:27)  HR: --  BP: --  BP(mean): --  RR: 18 (12-30-24 @ 08:27) (18 - 18)  SpO2: --    Orthostatic VS  12-30-24 @ 08:27  Lying BP: --/-- HR: --  Sitting BP: 107/58 HR: 95  Standing BP: 105/73 HR: 104  Site: --  Mode: --  Orthostatic VS  12-29-24 @ 19:36  Lying BP: --/-- HR: --  Sitting BP: 112/77 HR: 102  Standing BP: 99/82 HR: 117  Site: --  Mode: --  Orthostatic VS  12-29-24 @ 13:58  Lying BP: --/-- HR: --  Sitting BP: 99/69 HR: 98  Standing BP: 103/91 HR: 103  Site: --  Mode: --    Lipid Panel: Date/Time: 01-03-24 @ 05:30  Cholesterol, Serum: 180  LDL Cholesterol Calculated: 79  HDL Cholesterol, Serum: 91  Total Cholesterol/HDL Ration Measurement: --  Triglycerides, Serum: 48

## 2024-12-30 NOTE — BH INPATIENT PSYCHIATRY ASSESSMENT NOTE - NSICDXBHTERTIARYDX_PSY_ALL_CORE
R/O Delusional disorder   F22  R/O Paranoid personality disorder   F60.0   R/O Delusional disorder   F22  R/O Phentermine adverse reaction   T50.5X5A   R/O Delusional disorder   F22  R/O Phentermine adverse reaction   T50.5X5A  R/O Late onset schizophrenia   F20.89

## 2024-12-30 NOTE — BH INPATIENT PSYCHIATRY ASSESSMENT NOTE - NSSUICPROTFACT_PSY_ALL_CORE
Responsibility to children, family, or others/Cultural, spiritual and/or moral attitudes against suicide/Engaged in work or school

## 2024-12-30 NOTE — BH INPATIENT PSYCHIATRY ASSESSMENT NOTE - NSBHATTESTCOMMENTATTENDFT_PSY_A_CORE
Patient seen and evaluated with Dr. Mcclain. Patient is generally calm and cooperative and coherent, though with irritable/guarded edge and illogical thought process. Collateral very concerning for paranoid delusions, which appear ongoing for past year atop a healthy/functioning baseline. Diagnosis c/f delusional d/o, late onset schizophrenia (bimodal peak), r/o phentermine SE. Phertemine stopped, Risperdal started, FEP labs ordered.

## 2024-12-30 NOTE — BH INPATIENT PSYCHIATRY ASSESSMENT NOTE - RISK ASSESSMENT
Protective factors: stable housing, responsibility to child, moral attitude against suicide     Risk factors: Current psychiatric diagnosis  CHRONIC risk factors = psychosis, prior hospitalization, poor insight  ACUTE risk factors = worsening paranoia, lack of medications or psychiatric f/u  PROTECTIVE factors = no SI or HI, son, recently employed, currently sober

## 2024-12-30 NOTE — BH INPATIENT PSYCHIATRY ASSESSMENT NOTE - CURRENT MEDICATION
MEDICATIONS  (STANDING):  risperiDONE   Tablet 1 milliGRAM(s) Oral at bedtime    MEDICATIONS  (PRN):  LORazepam   Injectable 2 milliGRAM(s) IntraMuscular every 6 hours PRN severe agitation   MEDICATIONS  (STANDING):  multivitamin 1 Tablet(s) Oral daily  risperiDONE   Tablet 1 milliGRAM(s) Oral at bedtime    MEDICATIONS  (PRN):  LORazepam   Injectable 2 milliGRAM(s) IntraMuscular every 6 hours PRN severe agitation   MEDICATIONS  (STANDING):  multivitamin 1 Tablet(s) Oral daily  risperiDONE   Tablet 1 milliGRAM(s) Oral at bedtime    MEDICATIONS  (PRN):  acetaminophen     Tablet .. 650 milliGRAM(s) Oral every 6 hours PRN Mild Pain (1 - 3), Moderate Pain (4 - 6)  haloperidol     Tablet 5 milliGRAM(s) Oral every 6 hours PRN Agitation 2/2 psychosis  haloperidol    Injectable 5 milliGRAM(s) IntraMuscular once PRN Agitation 2/2 psychosis  LORazepam     Tablet 2 milliGRAM(s) Oral every 6 hours PRN Agitation 2/2 psychosis or severe anxiety  LORazepam   Injectable 2 milliGRAM(s) IntraMuscular once PRN Agitation 2/2 psychosis  melatonin. 3 milliGRAM(s) Oral at bedtime PRN Insomnia

## 2024-12-31 LAB
A1C WITH ESTIMATED AVERAGE GLUCOSE RESULT: 5.1 % — SIGNIFICANT CHANGE UP (ref 4–5.6)
CERULOPLASMIN SERPL-MCNC: 35 MG/DL — SIGNIFICANT CHANGE UP (ref 16–45)
CHOLEST SERPL-MCNC: 191 MG/DL — SIGNIFICANT CHANGE UP
ERYTHROCYTE [SEDIMENTATION RATE] IN BLOOD: 7 MM/HR — SIGNIFICANT CHANGE UP (ref 4–25)
ESTIMATED AVERAGE GLUCOSE: 100 — SIGNIFICANT CHANGE UP
FOLATE SERPL-MCNC: >20 NG/ML — HIGH (ref 3.1–17.5)
HDLC SERPL-MCNC: 81 MG/DL — SIGNIFICANT CHANGE UP
LIPID PNL WITH DIRECT LDL SERPL: 100 MG/DL — HIGH
NON HDL CHOLESTEROL: 110 MG/DL — SIGNIFICANT CHANGE UP
TRIGL SERPL-MCNC: 53 MG/DL — SIGNIFICANT CHANGE UP
VIT B12 SERPL-MCNC: 795 PG/ML — SIGNIFICANT CHANGE UP (ref 200–900)

## 2024-12-31 PROCEDURE — 99232 SBSQ HOSP IP/OBS MODERATE 35: CPT | Mod: GC

## 2024-12-31 RX ORDER — IBUPROFEN 200 MG
400 TABLET ORAL EVERY 6 HOURS
Refills: 0 | Status: DISCONTINUED | OUTPATIENT
Start: 2024-12-31 | End: 2025-01-07

## 2024-12-31 RX ADMIN — ACETAMINOPHEN 650 MILLIGRAM(S): 80 SOLUTION/ DROPS ORAL at 12:58

## 2024-12-31 RX ADMIN — ACETAMINOPHEN 650 MILLIGRAM(S): 80 SOLUTION/ DROPS ORAL at 22:39

## 2024-12-31 RX ADMIN — ACETAMINOPHEN 650 MILLIGRAM(S): 80 SOLUTION/ DROPS ORAL at 12:04

## 2024-12-31 RX ADMIN — Medication 400 MILLIGRAM(S): at 16:38

## 2024-12-31 RX ADMIN — Medication 400 MILLIGRAM(S): at 17:18

## 2024-12-31 RX ADMIN — ACETAMINOPHEN 650 MILLIGRAM(S): 80 SOLUTION/ DROPS ORAL at 19:33

## 2024-12-31 NOTE — BH INPATIENT PSYCHIATRY PROGRESS NOTE - NSBHASSESSSUMMFT_PSY_ALL_CORE
Patient is a 43 yo F, currently domiciled with family (parents, 16 year old son), former mental health worker, PM of obesity s/p gastric bypass in 2011 (on phentermine), PPHx of new onset psychosis with one related prior psychiatric hospitalization ([facility] in 1/2024), who was BIB police after trying to take her 15 yo son out of the home and to a shelter     This is a patient with little formal psychiatric history, besides one psychiatric hospitalization a year ago for paranoia and alcohol intoxication at [facility]. She has not followed up with care or taken medications since then. Per collateral obtained from mother, patient had been at high functioning baseline prior to one year ago, when she became acutely paranoid, with related suspicious behaviors/statements (unplugging devices, believing she is being followed etc), culminating in agitation and desire to remove son from home to take to a shelter, leading to this ED presentation. Patient required IM medications in the ED. On evaluation on ML3, patient is calm and cooperative, though becomes increasingly irritable and defensive over interview, is overall coherent though with notable illogical reasoning that is at times difficult to follow.     On assessment today, pt continued to be guarded an increasingly more irritable as the interview went on. Pt has no insight into her condition and believes that her family and treatment team's concern about her mental health are unfounded and does not intend to take her medication at this time. Her presentation is likely a result of active paranoia. Working diagnosis at this is a late-onset schizophrenia-spectrum illness (given wide ranging paranoia, some thought disorder) vs primary delusional disorder (given lack of prominent other positive symptoms) vs phentermine induced psychosis (reported SE, but appears less likely). Given the late onset of her symptoms, FEP labs are ordered and pending. Will continue Risperdal, continue to monitor. Will also speak with mother to plan a family meeting for later in the week to encourage pt to accept treatment.     Plan:  1. Legal: Admitted on ML3 continue 9.39 status  2. Safety: No reported SI/SIB/HI/VI currently on unit; continue routine observation.  -Haldol/Ativan PRN medications for safety/agitation  3. Psychiatric:  -Risperdal 1mg PO QHS; titrate until effect and tolerability; R/B/A and side effects discussed  	-Metabolic labs ordered  4. Therapy: group & milieu therapy  5. Medical: H/o gastric bypass. Start Multivitamin. Previously on phentermine for weight loss, discontinued in the ED . Will keep medication discontinued to due known potential side effect of paranoia and psychosis   -Add FEP labs (RPR, Vit B12, folate, ABAD, ceruloplasmin, ESR)  6. Collateral: Collateral from Mother, Marleni Jones [phone]   7. 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 She has not followed up with care or taken medications since then. Per collateral obtained from mother, patient had been at high functioning baseline prior to one year ago, when she became acutely paranoid, with related suspicious behaviors/statements (unplugging devices, believing she is being followed etc), culminating in agitation and desire to remove son from home to take to a shelter, leading to this ED presentation.     On assessment today, pt continued to be guarded and increasingly irritable as the interview went on. Pt has no insight into her condition and believes that her family and treatment team's concern about her mental health are unfounded and does not intend to take her medication at this time. Her presentation is likely a result of active paranoia. Working diagnosis at this is a late-onset schizophrenia-spectrum illness (given wide ranging paranoia, possible AH) vs delusional disorder (given lack of prominent other positive symptoms) vs phentermine induced psychosis (reported SE, but appears less likely). Given the late onset of her symptoms, FEP labs are ordered and pending. Will continue Risperdal, continue to monitor, though pt refusing. Will also speak with mother to plan a family meeting for later in the week to encourage pt to accept treatment.     Plan:  1. Legal: Admitted on ML3 continue 9.39 status  2. Safety: No reported SI/SIB/HI/VI currently on unit; continue routine observation.  -Haldol/Ativan PRN medications for safety/agitation  3. Psychiatric:  -Risperdal 1mg PO QHS; titrate until effect and tolerability; R/B/A and side effects discussed  	-Metabolic labs ordered  4. Therapy: group & milieu therapy  5. Medical: H/o gastric bypass. Start Multivitamin. Previously on phentermine for weight loss, discontinued in the ED . Will keep medication discontinued to due known potential side effect of paranoia and psychosis   -Add FEP labs (RPR, Vit V97--wly, folate--wnl, ABAD, ceruloplasmin, ESR--wnl)  6. Collateral: Collateral from Mother, Marleni Jones [phone]   7. Disposition: When stable/pending clinical improvement    Patient is a 43 yo F, currently domiciled with family (parents, 16 year old son), former mental health worker, PMH of obesity s/p gastric bypass in 2011 (on phentermine), PPHx of new onset psychosis with one related prior psychiatric hospitalization ([facility] in 1/2024), who was BIB police after trying to take her 17 yo son out of the home and to a shelter     This is a patient with little formal psychiatric history, besides one psychiatric hospitalization a year ago for paranoia and alcohol intoxication at [facility] (where pt declined antipsychotics and was discharged on a 3DL, though did endorse multiple paranoid delusions). She has not followed up with care or taken medications since then. Per collateral obtained from mother, patient had been at high functioning baseline prior to one year ago, when she became acutely paranoid, with related suspicious behaviors/statements (unplugging devices, believing she is being followed etc), culminating in agitation and desire to remove son from home to take to a shelter, leading to this ED presentation.     On assessment today, pt continued to be guarded and increasingly irritable as the interview went on. Pt has no insight into her condition and believes that her family and treatment team's concern about her mental health are unfounded and does not intend to take her medication at this time. Her presentation is likely a result of active paranoia. Pt began menopause about two years ago which is also around the time of onset of her symptoms. Working diagnosis at this is a late-onset schizophrenia-spectrum illness (given wide ranging paranoia, possible AH) vs delusional disorder (given lack of prominent other positive symptoms) vs phentermine induced psychosis (reported SE, but appears less likely). Given the late onset of her symptoms, FEP labs are ordered and pending. Will continue Risperdal, continue to monitor, though pt refusing. Spoke with mother to plan a family meeting for later in the week to encourage pt to accept treatment. Scheduled for Thursday around 1:30.      Plan:  1. Legal: Admitted on ML3 continue 9.39 status  2. Safety: No reported SI/SIB/HI/VI currently on unit; continue routine observation.  -Haldol/Ativan PRN medications for safety/agitation  3. Psychiatric:  -Risperdal 1mg PO QHS; titrate until effect and tolerability; R/B/A and side effects discussed  	-Metabolic labs ordered  4. Therapy: group & milieu therapy  5. Medical: H/o gastric bypass. Start Multivitamin. Previously on phentermine for weight loss, discontinued in the ED . Will keep medication discontinued to due known potential side effect of paranoia and psychosis   -Add FEP labs (RPR, Vit L81--wqr, folate--wnl, ABAD, ceruloplasmin, ESR--wnl)  6. Collateral: Collateral from Mother, Marleni Jones [phone]   7. Disposition: When stable/pending clinical improvement

## 2024-12-31 NOTE — BH INPATIENT PSYCHIATRY PROGRESS NOTE - PRN MEDS
MEDICATIONS  (PRN):  acetaminophen     Tablet .. 650 milliGRAM(s) Oral every 6 hours PRN Mild Pain (1 - 3), Moderate Pain (4 - 6)  haloperidol     Tablet 5 milliGRAM(s) Oral every 6 hours PRN Agitation 2/2 psychosis  haloperidol    Injectable 5 milliGRAM(s) IntraMuscular once PRN Agitation 2/2 psychosis  LORazepam     Tablet 2 milliGRAM(s) Oral every 6 hours PRN Agitation 2/2 psychosis or severe anxiety  LORazepam   Injectable 2 milliGRAM(s) IntraMuscular once PRN Agitation 2/2 psychosis  melatonin. 3 milliGRAM(s) Oral at bedtime PRN Insomnia   Sarecycline Pregnancy And Lactation Text: This medication is Pregnancy Category D and not consider safe during pregnancy. It is also excreted in breast milk.

## 2024-12-31 NOTE — BH INPATIENT PSYCHIATRY PROGRESS NOTE - CURRENT MEDICATION
MEDICATIONS  (STANDING):  multivitamin 1 Tablet(s) Oral daily  risperiDONE   Tablet 1 milliGRAM(s) Oral at bedtime    MEDICATIONS  (PRN):  acetaminophen     Tablet .. 650 milliGRAM(s) Oral every 6 hours PRN Mild Pain (1 - 3), Moderate Pain (4 - 6)  haloperidol     Tablet 5 milliGRAM(s) Oral every 6 hours PRN Agitation 2/2 psychosis  haloperidol    Injectable 5 milliGRAM(s) IntraMuscular once PRN Agitation 2/2 psychosis  LORazepam     Tablet 2 milliGRAM(s) Oral every 6 hours PRN Agitation 2/2 psychosis or severe anxiety  LORazepam   Injectable 2 milliGRAM(s) IntraMuscular once PRN Agitation 2/2 psychosis  melatonin. 3 milliGRAM(s) Oral at bedtime PRN Insomnia

## 2024-12-31 NOTE — BH INPATIENT PSYCHIATRY PROGRESS NOTE - NSBHMETABOLIC_PSY_ALL_CORE_FT
BMI: BMI (kg/m2): 34.6 (12-29-24 @ 13:58)  HbA1c: A1C with Estimated Average Glucose Result: 5.1 % (12-31-24 @ 09:15)    Glucose:   BP: 112/78 (12-30-24 @ 18:47) (112/78 - 125/90)Vital Signs Last 24 Hrs  T(C): 36.7 (12-31-24 @ 08:31), Max: 37 (12-30-24 @ 18:47)  T(F): 98.1 (12-31-24 @ 08:31), Max: 98.6 (12-30-24 @ 18:47)  HR: 93 (12-30-24 @ 18:47) (93 - 93)  BP: 112/78 (12-30-24 @ 18:47) (112/78 - 112/78)  BP(mean): --  RR: --  SpO2: --    Orthostatic VS  12-31-24 @ 08:31  Lying BP: --/-- HR: --  Sitting BP: 111/85 HR: 77  Standing BP: 106/80 HR: 91  Site: --  Mode: --  Orthostatic VS  12-30-24 @ 08:27  Lying BP: --/-- HR: --  Sitting BP: 107/58 HR: 95  Standing BP: 105/73 HR: 104  Site: --  Mode: --  Orthostatic VS  12-29-24 @ 19:36  Lying BP: --/-- HR: --  Sitting BP: 112/77 HR: 102  Standing BP: 99/82 HR: 117  Site: --  Mode: --    Lipid Panel: Date/Time: 12-31-24 @ 09:15  Cholesterol, Serum: 191  LDL Cholesterol Calculated: 100  HDL Cholesterol, Serum: 81  Total Cholesterol/HDL Ration Measurement: --  Triglycerides, Serum: 53

## 2024-12-31 NOTE — BH INPATIENT PSYCHIATRY PROGRESS NOTE - NSBHFUPINTERVALHXFT_PSY_A_CORE
Pt evaluated this morning. VSS, refused medications overnight. Pt still believes that there is no reason for her to be admitted despite presenting her with evidence of her actions that led to her admission. Pt denied any psychiatric symptoms and states that she is fine and has her head on straight. Per [facility] notes during her last admission pt was having persecutory delusions, stating that people are following her and listening to her conversations and planes flying overhead from [facility] are taking photographs of her. This reported before from last admission was consistent with mother's account of pt behavior over the last month. Pt denies SI, HI or AH/VH. States that she wants to be discharged as she will not take her medications.  Pt evaluated this morning. VSS, refused medications overnight. Pt still believes that there is no reason for her to be admitted despite describing her actions that led to her admission. Pt denied any psychiatric symptoms and states that she is fine and has her head on straight. Per [facility] notes during her last admission pt was having persecutory delusions, stating that people are following her and listening to her conversations and planes flying overhead from [facility] are taking photographs of her. This reported before from last admission was consistent with mother's account of pt behavior over the last month. Pt denies SI, HI or AH/VH. States that she wants to be discharged as she will not take her medications.  She notes she doesn't have active insurance, but does not want the hospital to help her activate Medicaid.

## 2024-12-31 NOTE — BH SOCIAL WORK INITIAL PSYCHOSOCIAL EVALUATION - OTHER PAST PSYCHIATRIC HISTORY (INCLUDE DETAILS REGARDING ONSET, COURSE OF ILLNESS, INPATIENT/OUTPATIENT TREATMENT)
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 Medical hx significant for obesity s/p gastric bypass 2011, currently taking phentermine prescribed by PCP 37.5 mg fow weight loss. Patient  has a history of 1 previous psychiatric hospitalization on 9.27 at [facility] for paranoia, agitated behavior, was held for several weeks but refused antipsychotic and was discharged, did not follow-up. Patient at that time had made 5 police reports, emailed [employer] and . Patient has remained home in [place] with mother and 16 year-old son over the past year, spending most of her time at home while son is in school, not working. She has gotten increasingly paranoid, believing that people she is unable to identify are watching her and following her. She believes that they are doing something in the home, and she has heard them making noise in the house, she unplugs the phone, turns off the heat as she believes something will happen. She has refused any treatment for

## 2024-12-31 NOTE — BH INPATIENT PSYCHIATRY PROGRESS NOTE - MSE UNSTRUCTURED FT
Appearance: grooming-intact, wearing surgical bonnet and hospital gown on the unit   Behavior: cooperative, fairly calm though gets more activated as interview continues, no PMA/PMR  Speech: regular rate, rhythm and volume mostly, could be rapid at times  Mood: "Fine"  Affect: increasing irritability during interview  Thought process: coherent, largely linear, though could be difficult to follow at times with some element of illogical thought process (unable to give logical reasoning for decisions made such as attempting to leave the house with son at 1am, unable to reason through the events that led to her current or previous hospitalizations)  Thought content: no delusions elicited on interview but per mother she exhibits evidence of persecutory delusions at home (thinks people are following her around, believes people are listening to her phone calls, thinks that someone is out to get her and destroy her house)  Perceptions: no AH, VH elicited on interview but per mother, pt does hear people in the house who aren't there and claims to hear clicking on her phone calls  Insight: poor   Judgement: poor   Cognition: grossly intact   Gait: intact Appearance: grooming-intact, wearing surgical bonnet and hospital gown on the unit   Behavior: cooperative, fairly calm though gets more activated as interview continues, no PMA/PMR  Speech: regular rate, rhythm and volume mostly, could be rapid at times  Mood: "Fine"  Affect: increasing irritability during interview  Thought process: coherent, largely linear, though could be difficult to follow at times with some illogical thought process (unable to give logical reasoning for decisions made such as attempting to leave the house with son at 1am, unable to reason through the events that led to her current or previous hospitalizations)  Thought content: no sydney delusions elicited on interview but per mother she exhibits evidence of persecutory delusions at home (thinks people are following her around, believes people are listening to her phone calls, thinks that someone is out to get her and destroy her house), and has a paranoid stance with team  Perceptions: no AH, VH elicited on interview but per mother, pt does hear people in the house who aren't there and claims to hear clicking on her phone calls  Insight: poor   Judgement: poor   Cognition: grossly intact   Gait: intact

## 2024-12-31 NOTE — BH INPATIENT PSYCHIATRY PROGRESS NOTE - NSBHCHARTREVIEWVS_PSY_A_CORE FT
Vital Signs Last 24 Hrs  T(C): 36.7 (12-31-24 @ 08:31), Max: 37 (12-30-24 @ 18:47)  T(F): 98.1 (12-31-24 @ 08:31), Max: 98.6 (12-30-24 @ 18:47)  HR: 93 (12-30-24 @ 18:47) (93 - 93)  BP: 112/78 (12-30-24 @ 18:47) (112/78 - 112/78)  BP(mean): --  RR: --  SpO2: --    Orthostatic VS  12-31-24 @ 08:31  Lying BP: --/-- HR: --  Sitting BP: 111/85 HR: 77  Standing BP: 106/80 HR: 91  Site: --  Mode: --  Orthostatic VS  12-30-24 @ 08:27  Lying BP: --/-- HR: --  Sitting BP: 107/58 HR: 95  Standing BP: 105/73 HR: 104  Site: --  Mode: --  Orthostatic VS  12-29-24 @ 19:36  Lying BP: --/-- HR: --  Sitting BP: 112/77 HR: 102  Standing BP: 99/82 HR: 117  Site: --  Mode: --

## 2025-01-01 LAB — T PALLIDUM AB TITR SER: NEGATIVE — SIGNIFICANT CHANGE UP

## 2025-01-01 PROCEDURE — 99231 SBSQ HOSP IP/OBS SF/LOW 25: CPT

## 2025-01-01 RX ADMIN — Medication 400 MILLIGRAM(S): at 01:20

## 2025-01-01 RX ADMIN — Medication 400 MILLIGRAM(S): at 00:35

## 2025-01-01 RX ADMIN — ACETAMINOPHEN 650 MILLIGRAM(S): 80 SOLUTION/ DROPS ORAL at 20:34

## 2025-01-01 RX ADMIN — ACETAMINOPHEN 650 MILLIGRAM(S): 80 SOLUTION/ DROPS ORAL at 09:56

## 2025-01-01 RX ADMIN — Medication 400 MILLIGRAM(S): at 23:44

## 2025-01-01 RX ADMIN — ACETAMINOPHEN 650 MILLIGRAM(S): 80 SOLUTION/ DROPS ORAL at 07:24

## 2025-01-01 NOTE — BH INPATIENT PSYCHIATRY PROGRESS NOTE - NSBHMETABOLIC_PSY_ALL_CORE_FT
BMI: BMI (kg/m2): 34.6 (12-29-24 @ 13:58)  HbA1c: A1C with Estimated Average Glucose Result: 5.1 % (12-31-24 @ 09:15)    Glucose:   BP: 112/78 (12-30-24 @ 18:47) (112/78 - 112/78)Vital Signs Last 24 Hrs  T(C): 36.8 (01-01-25 @ 08:29), Max: 37.1 (12-31-24 @ 18:48)  T(F): 98.2 (01-01-25 @ 08:29), Max: 98.8 (12-31-24 @ 18:48)  HR: --  BP: --  BP(mean): --  RR: --  SpO2: --    Orthostatic VS  01-01-25 @ 08:29  Lying BP: --/-- HR: --  Sitting BP: 115/61 HR: 73  Standing BP: 90/76 HR: 77  Site: upper left arm  Mode: electronic  Orthostatic VS  12-31-24 @ 18:48  Lying BP: --/-- HR: --  Sitting BP: 107/77 HR: 80  Standing BP: 106/75 HR: 84  Site: --  Mode: --  Orthostatic VS  12-31-24 @ 08:31  Lying BP: --/-- HR: --  Sitting BP: 111/85 HR: 77  Standing BP: 106/80 HR: 91  Site: --  Mode: --    Lipid Panel: Date/Time: 12-31-24 @ 09:15  Cholesterol, Serum: 191  LDL Cholesterol Calculated: 100  HDL Cholesterol, Serum: 81  Total Cholesterol/HDL Ration Measurement: --  Triglycerides, Serum: 53

## 2025-01-01 NOTE — BH INPATIENT PSYCHIATRY PROGRESS NOTE - OTHER
appropriate  limited  wearing zipped up coat and a shower cap (hospital issued)  low end of fair  impaired

## 2025-01-01 NOTE — BH INPATIENT PSYCHIATRY PROGRESS NOTE - PRN MEDS
MEDICATIONS  (PRN):  acetaminophen     Tablet .. 650 milliGRAM(s) Oral every 6 hours PRN Mild Pain (1 - 3), Moderate Pain (4 - 6)  haloperidol     Tablet 5 milliGRAM(s) Oral every 6 hours PRN Agitation 2/2 psychosis  haloperidol    Injectable 5 milliGRAM(s) IntraMuscular once PRN Agitation 2/2 psychosis  ibuprofen  Tablet. 400 milliGRAM(s) Oral every 6 hours PRN Mild Pain (1 - 3), Moderate Pain (4 - 6)  LORazepam     Tablet 2 milliGRAM(s) Oral every 6 hours PRN Agitation 2/2 psychosis or severe anxiety  LORazepam   Injectable 2 milliGRAM(s) IntraMuscular once PRN Agitation 2/2 psychosis  melatonin. 3 milliGRAM(s) Oral at bedtime PRN Insomnia

## 2025-01-01 NOTE — BH INPATIENT PSYCHIATRY PROGRESS NOTE - CURRENT MEDICATION
MEDICATIONS  (STANDING):  multivitamin 1 Tablet(s) Oral daily  risperiDONE   Tablet 1 milliGRAM(s) Oral at bedtime    MEDICATIONS  (PRN):  acetaminophen     Tablet .. 650 milliGRAM(s) Oral every 6 hours PRN Mild Pain (1 - 3), Moderate Pain (4 - 6)  haloperidol     Tablet 5 milliGRAM(s) Oral every 6 hours PRN Agitation 2/2 psychosis  haloperidol    Injectable 5 milliGRAM(s) IntraMuscular once PRN Agitation 2/2 psychosis  ibuprofen  Tablet. 400 milliGRAM(s) Oral every 6 hours PRN Mild Pain (1 - 3), Moderate Pain (4 - 6)  LORazepam     Tablet 2 milliGRAM(s) Oral every 6 hours PRN Agitation 2/2 psychosis or severe anxiety  LORazepam   Injectable 2 milliGRAM(s) IntraMuscular once PRN Agitation 2/2 psychosis  melatonin. 3 milliGRAM(s) Oral at bedtime PRN Insomnia

## 2025-01-01 NOTE — BH INPATIENT PSYCHIATRY PROGRESS NOTE - NSBHFUPINTERVALHXFT_PSY_A_CORE
No significant interval events since last seen, Patient continues to refuse psychiatric medications and has only taken Tylenol and Motrin. No agitation, aggression and has not received any STAT IM PRNs; also has not utilized any PO PRNs. Patient seen near nurses station - wearing a shower cap and a parka coat zipped all the way up. Patient asked if she is being discharged today; redirected. Patient remained calm and accepted redirection appropriately. Patient had no complaints and denies all psychiatric pertinent positives/negatives. Denies suicidality. Feels safe on Unit.

## 2025-01-01 NOTE — BH INPATIENT PSYCHIATRY PROGRESS NOTE - NSBHASSESSSUMMFT_PSY_ALL_CORE
Patient is a 43 yo F, currently domiciled with family (parents, 16 year old son), former mental health worker, PM of obesity s/p gastric bypass in 2011 (on phentermine), PPHx of new onset psychosis with one related prior psychiatric hospitalization ([facility] in 1/2024), who was BIB police after trying to take her 17 yo son out of the home and to a shelter. This is a patient with little formal psychiatric history, besides one psychiatric hospitalization a year ago for paranoia and alcohol intoxication at [facility] (where pt declined antipsychotics and was discharged on a 3DL, though did endorse multiple paranoid delusions). She has not followed up with care or taken medications since then. Per collateral obtained from mother, patient had been at high functioning baseline prior to one year ago, when she became acutely paranoid, with related suspicious behaviors/statements (unplugging devices, believing she is being followed etc), culminating in agitation and desire to remove son from home to take to a shelter, leading to this ED presentation. On presentation to Unit, pt continued to be guarded and increasingly irritable as the interview went on. Pt has no insight into her condition and believes that her family and treatment team's concern about her mental health are unfounded and does not intend to take her medication at this time. Her presentation is likely a result of active paranoia. Pt began menopause about two years ago which is also around the time of onset of her symptoms. Working diagnosis at this is a late-onset schizophrenia-spectrum illness (given wide ranging paranoia, possible AH) vs delusional disorder (given lack of prominent other positive symptoms) vs phentermine induced psychosis (reported SE, but appears less likely). Given the late onset of her symptoms, FEP labs are ordered and pending. Will continue Risperdal, continue to monitor, though pt refusing. Spoke with mother to plan a family meeting for later in the week to encourage pt to accept treatment. Scheduled for Thursday around 1:30.      Plan:  1. Legal: Admitted on ML3 continue 9.39 status  2. Safety: No reported SI/SIB/HI/VI currently on unit; continue routine observation.  -Haldol/Ativan PRN medications for safety/agitation  3. Psychiatric:  -continues to refuse Risperdal 1mg PO QHS; R/B/A and side effects discussed  	-Metabolic labs ordered  4. Therapy: group & milieu therapy  5. Medical: H/o gastric bypass. Start Multivitamin. Previously on phentermine for weight loss, discontinued in the ED . Will keep medication discontinued to due known potential side effect of paranoia and psychosis   -Add FEP labs (RPR, Vit L76--lii, folate--wnl, ABAD, ceruloplasmin, ESR--wnl)  6. Collateral: Collateral from Mother, Marleni Jones [phone]   7. Disposition: When stable/pending clinical improvement

## 2025-01-01 NOTE — BH INPATIENT PSYCHIATRY PROGRESS NOTE - NSBHCHARTREVIEWVS_PSY_A_CORE FT
Vital Signs Last 24 Hrs  T(C): 36.8 (01-01-25 @ 08:29), Max: 37.1 (12-31-24 @ 18:48)  T(F): 98.2 (01-01-25 @ 08:29), Max: 98.8 (12-31-24 @ 18:48)  HR: --  BP: --  BP(mean): --  RR: --  SpO2: --    Orthostatic VS  01-01-25 @ 08:29  Lying BP: --/-- HR: --  Sitting BP: 115/61 HR: 73  Standing BP: 90/76 HR: 77  Site: upper left arm  Mode: electronic  Orthostatic VS  12-31-24 @ 18:48  Lying BP: --/-- HR: --  Sitting BP: 107/77 HR: 80  Standing BP: 106/75 HR: 84  Site: --  Mode: --  Orthostatic VS  12-31-24 @ 08:31  Lying BP: --/-- HR: --  Sitting BP: 111/85 HR: 77  Standing BP: 106/80 HR: 91  Site: --  Mode: --

## 2025-01-02 LAB — ANA TITR SER: NEGATIVE — SIGNIFICANT CHANGE UP

## 2025-01-02 PROCEDURE — 99232 SBSQ HOSP IP/OBS MODERATE 35: CPT | Mod: GC

## 2025-01-02 RX ADMIN — ACETAMINOPHEN 650 MILLIGRAM(S): 80 SOLUTION/ DROPS ORAL at 23:18

## 2025-01-02 RX ADMIN — Medication 400 MILLIGRAM(S): at 17:41

## 2025-01-02 RX ADMIN — ACETAMINOPHEN 650 MILLIGRAM(S): 80 SOLUTION/ DROPS ORAL at 12:40

## 2025-01-02 RX ADMIN — Medication 400 MILLIGRAM(S): at 23:58

## 2025-01-02 RX ADMIN — Medication 400 MILLIGRAM(S): at 23:21

## 2025-01-02 RX ADMIN — ACETAMINOPHEN 650 MILLIGRAM(S): 80 SOLUTION/ DROPS ORAL at 03:05

## 2025-01-02 RX ADMIN — Medication 400 MILLIGRAM(S): at 12:40

## 2025-01-02 RX ADMIN — ACETAMINOPHEN 650 MILLIGRAM(S): 80 SOLUTION/ DROPS ORAL at 20:30

## 2025-01-02 NOTE — BH TREATMENT PLAN - NSTXPSYCHOINTERRN_PSY_ALL_CORE
Assess thought process daily  Monitor medication compliance and response  Maintain reality orientation daily   Maintain safe and therapeutic process  If command hallucinations present, assess for content and direction

## 2025-01-02 NOTE — BH INPATIENT PSYCHIATRY PROGRESS NOTE - NSBHMETABOLIC_PSY_ALL_CORE_FT
BMI: BMI (kg/m2): 34.6 (12-29-24 @ 13:58)  HbA1c: A1C with Estimated Average Glucose Result: 5.1 % (12-31-24 @ 09:15)    Glucose:   BP: 112/78 (12-30-24 @ 18:47) (112/78 - 112/78)Vital Signs Last 24 Hrs  T(C): 36.7 (01-02-25 @ 08:23), Max: 36.7 (01-02-25 @ 08:23)  T(F): 98 (01-02-25 @ 08:23), Max: 98 (01-02-25 @ 08:23)  HR: --  BP: --  BP(mean): --  RR: --  SpO2: --    Orthostatic VS  01-02-25 @ 08:23  Lying BP: --/-- HR: --  Sitting BP: 102/75 HR: 79  Standing BP: 106/75 HR: 73  Site: --  Mode: --  Orthostatic VS  01-01-25 @ 08:29  Lying BP: --/-- HR: --  Sitting BP: 115/61 HR: 73  Standing BP: 90/76 HR: 77  Site: upper left arm  Mode: electronic  Orthostatic VS  12-31-24 @ 18:48  Lying BP: --/-- HR: --  Sitting BP: 107/77 HR: 80  Standing BP: 106/75 HR: 84  Site: --  Mode: --    Lipid Panel: Date/Time: 12-31-24 @ 09:15  Cholesterol, Serum: 191  LDL Cholesterol Calculated: 100  HDL Cholesterol, Serum: 81  Total Cholesterol/HDL Ration Measurement: --  Triglycerides, Serum: 53   BMI: BMI (kg/m2): 34.6 (12-29-24 @ 13:58)  HbA1c: A1C with Estimated Average Glucose Result: 5.1 % (12-31-24 @ 09:15)    Glucose:   BP: --Vital Signs Last 24 Hrs  T(C): 36.5 (01-03-25 @ 07:01), Max: 36.9 (01-02-25 @ 18:57)  T(F): 97.7 (01-03-25 @ 07:01), Max: 98.4 (01-02-25 @ 18:57)  HR: --  BP: --  BP(mean): --  RR: --  SpO2: --    Orthostatic VS  01-03-25 @ 07:01  Lying BP: --/-- HR: --  Sitting BP: 106/87 HR: 72  Standing BP: 113/78 HR: 72  Site: --  Mode: --  Orthostatic VS  01-02-25 @ 18:57  Lying BP: --/-- HR: --  Sitting BP: 101/75 HR: 81  Standing BP: 104/79 HR: 86  Site: --  Mode: --  Orthostatic VS  01-02-25 @ 08:23  Lying BP: --/-- HR: --  Sitting BP: 102/75 HR: 79  Standing BP: 106/75 HR: 73  Site: --  Mode: --    Lipid Panel: Date/Time: 12-31-24 @ 09:15  Cholesterol, Serum: 191  LDL Cholesterol Calculated: 100  HDL Cholesterol, Serum: 81  Total Cholesterol/HDL Ration Measurement: --  Triglycerides, Serum: 53
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
## 2025-01-02 NOTE — BH INPATIENT PSYCHIATRY PROGRESS NOTE - CURRENT MEDICATION
MEDICATIONS  (STANDING):  multivitamin 1 Tablet(s) Oral daily  risperiDONE   Tablet 1 milliGRAM(s) Oral at bedtime    MEDICATIONS  (PRN):  acetaminophen     Tablet .. 650 milliGRAM(s) Oral every 6 hours PRN Mild Pain (1 - 3), Moderate Pain (4 - 6)  haloperidol     Tablet 5 milliGRAM(s) Oral every 6 hours PRN Agitation 2/2 psychosis  haloperidol    Injectable 5 milliGRAM(s) IntraMuscular once PRN Agitation 2/2 psychosis  ibuprofen  Tablet. 400 milliGRAM(s) Oral every 6 hours PRN Mild Pain (1 - 3), Moderate Pain (4 - 6)  LORazepam     Tablet 2 milliGRAM(s) Oral every 6 hours PRN Agitation 2/2 psychosis or severe anxiety  LORazepam   Injectable 2 milliGRAM(s) IntraMuscular once PRN Agitation 2/2 psychosis  melatonin. 3 milliGRAM(s) Oral at bedtime PRN Insomnia   MEDICATIONS  (STANDING):  risperiDONE   Tablet 1 milliGRAM(s) Oral at bedtime    MEDICATIONS  (PRN):  acetaminophen     Tablet .. 650 milliGRAM(s) Oral every 6 hours PRN Mild Pain (1 - 3), Moderate Pain (4 - 6)  haloperidol     Tablet 5 milliGRAM(s) Oral every 6 hours PRN Agitation 2/2 psychosis  haloperidol    Injectable 5 milliGRAM(s) IntraMuscular once PRN Agitation 2/2 psychosis  ibuprofen  Tablet. 400 milliGRAM(s) Oral every 6 hours PRN Mild Pain (1 - 3), Moderate Pain (4 - 6)  LORazepam     Tablet 2 milliGRAM(s) Oral every 6 hours PRN Agitation 2/2 psychosis or severe anxiety  LORazepam   Injectable 2 milliGRAM(s) IntraMuscular once PRN Agitation 2/2 psychosis  melatonin. 3 milliGRAM(s) Oral at bedtime PRN Insomnia

## 2025-01-02 NOTE — BH INPATIENT PSYCHIATRY PROGRESS NOTE - OTHER
appropriate  wearing zipped up coat and a shower cap (hospital issued)  impaired  low end of fair  limited

## 2025-01-02 NOTE — BH INPATIENT PSYCHIATRY PROGRESS NOTE - MSE UNSTRUCTURED FT
Appearance: grooming-intact, wearing surgical bonnet, full coat and hospital gown on the unit   Behavior: cooperative, fairly calm though gets more activated as interview continues, no PMA/PMR  Speech: regular rate, rhythm and volume mostly, could be rapid at times  Mood: "Fine"  Affect: increasing irritability during interview  Thought process: coherent, largely linear, though could be difficult to follow at times with some illogical thought process (unable to give logical reasoning for decisions made such as attempting to leave the house with son at 1am, unable to reason through the events that led to her current or previous hospitalizations)  Thought content: +sydney delusions elicited on interview (hacker in her and family's phone, upstairs neighbors targeting her, left work due to belief that people were whispering about her) per mother she exhibits evidence of persecutory delusions at home (thinks people are following her around, believes people are listening to her phone calls, thinks that someone is out to get her and destroy her house), and has a paranoid stance with team  Perceptions: no AH, VH elicited on interview but per mother, pt does hear people in the house who aren't there and claims to hear clicking on her phone calls  Insight: poor   Judgement: poor   Cognition: grossly intact   Gait: intact Appearance: grooming-intact, wearing surgical bonnet, full coat and hospital gown on the unit   Behavior: cooperative, fairly calm though gets more upset at end of interview, no PMA/PMR  Speech: regular rate, rhythm and volume mostly, could be rapid at times  Mood: "Fine"  Affect: slightly irritable but improved compared to earlier in the week, increasingly upset and tearful towards end of interview  Thought process: coherent, though could be difficult to follow at times with illogical thought process  Thought content: +persecutory delusions elicited on interview (hacker in her and family's phone, upstairs neighbors targeting her, left work due to belief that people were whispering about her), also corroborated by mother's collateral  Perceptions: pt describes hearing noises and sounds that appear related to persecutory content, unclear if these are perceptual disturbances  Insight: poor   Judgement: poor   Cognition: grossly intact   Gait: intact

## 2025-01-02 NOTE — BH INPATIENT PSYCHIATRY PROGRESS NOTE - ADDITIONAL DETAILS / COMMENTS
refuses to answer most questions, very poor insight, does not believe that she has any psychiatric illness
refuses to answer most questions, very poor insight, does not believe that she has any psychiatric illness

## 2025-01-02 NOTE — BH INPATIENT PSYCHIATRY PROGRESS NOTE - NSBHASSESSSUMMFT_PSY_ALL_CORE
Patient is a 43 yo F, currently domiciled with family (parents, 16 year old son), former mental health worker, PM of obesity s/p gastric bypass in 2011 (on phentermine), PPHx of new onset psychosis with one related prior psychiatric hospitalization ([facility] in 1/2024), who was BIB police after trying to take her 17 yo son out of the home and to a shelter. This is a patient with little formal psychiatric history, besides one psychiatric hospitalization a year ago for paranoia and alcohol intoxication at [facility] (where pt declined antipsychotics and was discharged on a 3DL, though did endorse multiple paranoid delusions). She has not followed up with care or taken medications since then. Per collateral obtained from mother, patient had been at high functioning baseline prior to one year ago, when she became acutely paranoid, with related suspicious behaviors/statements (unplugging devices, believing she is being followed etc), culminating in agitation and desire to remove son from home to take to a shelter, leading to this ED presentation. On presentation to Unit, pt continued to be guarded and increasingly irritable as the interview went on. Pt has no insight into her condition and believes that her family and treatment team's concern about her mental health are unfounded and does not intend to take her medication at this time. Her presentation is likely a result of active paranoia. Pt began menopause about two years ago which is also around the time of onset of her symptoms. Working diagnosis at this is a late-onset schizophrenia-spectrum illness (given wide ranging paranoia, possible AH) vs delusional disorder (given lack of prominent other positive symptoms) vs phentermine induced psychosis (reported SE, but appears less likely). Given the late onset of her symptoms, FEP labs are ordered and pending. Will continue Risperdal, continue to monitor, though pt refusing. Spoke with mother to plan a family meeting for later in the week to encourage pt to accept treatment. Scheduled for Thursday around 1:30.      Plan:  1. Legal: Admitted on ML3 continue 9.39 status  2. Safety: No reported SI/SIB/HI/VI currently on unit; continue routine observation.  -Haldol/Ativan PRN medications for safety/agitation  3. Psychiatric:  -continues to refuse Risperdal 1mg PO QHS; R/B/A and side effects discussed  	-Metabolic labs ordered  4. Therapy: group & milieu therapy  5. Medical: H/o gastric bypass. Start Multivitamin. Previously on phentermine for weight loss, discontinued in the ED . Will keep medication discontinued to due known potential side effect of paranoia and psychosis   -Add FEP labs (RPR, Vit Q39--dfi, folate--wnl, ABAD, ceruloplasmin, ESR--wnl)  6. Collateral: Collateral from Mother, Marleni Jones [phone]   7. Disposition: When stable/pending clinical improvement    Patient is a 45 yo F, currently domiciled with family (parents, 16 year old son), former mental health worker, PMH of obesity s/p gastric bypass in 2011 (on phentermine), PPHx of new onset psychosis with one related prior psychiatric hospitalization ([facility] in 1/2024), who was BIB police after trying to take her 15 yo son out of the home and to a shelter. This is a patient with little formal psychiatric history, besides one psychiatric hospitalization a year ago for paranoia and alcohol intoxication at [facility] (where pt declined antipsychotics and was discharged on a 3DL, though did endorse multiple paranoid delusions). She has not followed up with care or taken medications since then. Per collateral obtained from mother, patient had been at high functioning baseline prior to one year ago, when she became acutely paranoid, with related suspicious behaviors/statements (unplugging devices, believing she is being followed etc), culminating in agitation and desire to remove son from home to take to a shelter, leading to this ED presentation. On presentation to Unit, pt continued to be guarded and increasingly irritable as the interview went on. Pt has no insight into her condition and believes that her family and treatment team's concern about her mental health are unfounded and does not intend to take her medication at this time. Her presentation is likely a result of active paranoia. Pt began menopause about two years ago which is also around the time of onset of her symptoms.      On evaluation today, pt elicited more sydney delusions when compared to her presentation on admission. Pt is still guarded but began talking about paranoid and persecutory delusions that she experiences. Pt has little insight into her condition and is unable to express a logical line a reasoning that would explain her thought process. She is fixed in her belief that her neighbors, coworkers and "hackers" are targeting her and out to get her despite lack of proof of this. Working diagnosis is a late-onset schizophrenia-spectrum illness (given wide ranging paranoia, possible AH) vs delusional disorder (given lack of prominent other positive symptoms) vs phentermine induced psychosis (reported SE, but appears less likely). Given the late onset of her symptoms, FEP labs are ordered and pending.  Will continue Risperdal, continue to monitor, though pt refusing. Spoke with mother to plan a family meeting for later in the week to encourage pt to accept treatment. Scheduled for Friday around 1:30.      Plan:  1. Legal: Admitted on ML3 continue 9.39 status  2. Safety: No reported SI/SIB/HI/VI currently on unit; continue routine observation.  -Haldol/Ativan PRN medications for safety/agitation  3. Psychiatric:  -continues to refuse Risperdal 1mg PO QHS; R/B/A and side effects discussed  	-Metabolic labs ordered  4. Therapy: group & milieu therapy  5. Medical: H/o gastric bypass. Start Multivitamin. Previously on phentermine for weight loss, discontinued in the ED . Will keep medication discontinued to due known potential side effect of paranoia and psychosis   -Add FEP labs (RPR- neg, Vit N41--cid, folate--wnl, ABAD-neg, ceruloplasmin, ESR--wnl)  6. Collateral: Collateral from Mother, Marleni Jones [phone]   7. Disposition: When stable/pending clinical improvement    Patient is a 45 yo F, currently domiciled with family (parents, 16 year old son), former mental health worker, PM of obesity s/p gastric bypass in 2011 (on phentermine), PPHx of new onset psychosis with one related prior psychiatric hospitalization ([facility] in 1/2024), who was BIB police after trying to take her 15 yo son out of the home and to a shelter. This is a patient with little formal psychiatric history, besides one psychiatric hospitalization a year ago for paranoia and alcohol intoxication at [facility] (where pt declined antipsychotics and was discharged on a 3DL, though did endorse multiple paranoid delusions). She has not followed up with care or taken medications since then. Per collateral obtained from mother, patient had been at high functioning baseline prior to one+ year ago, when she became acutely paranoid, with related suspicious behaviors/statements (unplugging devices, believing she is being followed etc), culminating in agitation and desire to remove son from home to take to a shelter, leading to this ED presentation. Of note, pt began menopause about two years ago which is also around the time of onset of her symptoms.      On evaluation today, pt shared multiple experiences consistent with persecutory delusions (related to former co-workers, phone hackers, upstairs neighbors, identity thieves). Pt has little insight into her condition and is unable to express a logical line a reasoning that would explain her thought process. She is fixed in her belief that her neighbors, coworkers and "hackers" are targeting her and out to get her despite lack of proof of this. Working diagnosis is a late-onset schizophrenia-spectrum illness (given wide ranging paranoid delusions, possible AH, parallel timing to menopause) vs delusional disorder (given lack of prominent other positive symptoms) vs phentermine induced psychosis (reported SE, but appears less likely). Given the late onset of her symptoms, FEP labs ordered, all wnl. Will continue Risperdal, continue to monitor, though pt refusing. Spoke with mother to plan a family meeting for later in the week to encourage pt to accept treatment. Scheduled for Friday around 1:30.      Plan:  1. Legal: Admitted on ML3 continue 9.39 status  2. Safety: No reported SI/SIB/HI/VI currently on unit; continue routine observation.  -Haldol/Ativan PRN medications for safety/agitation  3. Psychiatric:  -continues to refuse Risperdal 1mg PO QHS; R/B/A and side effects discussed  	-Metabolic labs reviewed  4. Therapy: group & milieu therapy  5. Medical: H/o gastric bypass. Start Multivitamin. Previously on phentermine for weight loss, discontinued in the ED . Will keep medication discontinued to due known potential side effect of paranoia and psychosis   -FEP labs (RPR- neg, Vit L72--fff, folate--wnl, ABAD-neg, ceruloplasmin, ESR--wnl)  6. Collateral: Collateral from Mother, Marleni Jones [phone]   7. Disposition: When stable/pending clinical improvement

## 2025-01-02 NOTE — BH INPATIENT PSYCHIATRY PROGRESS NOTE - NSBHCHARTREVIEWVS_PSY_A_CORE FT
Vital Signs Last 24 Hrs  T(C): 36.7 (01-02-25 @ 08:23), Max: 36.7 (01-02-25 @ 08:23)  T(F): 98 (01-02-25 @ 08:23), Max: 98 (01-02-25 @ 08:23)  HR: --  BP: --  BP(mean): --  RR: --  SpO2: --    Orthostatic VS  01-02-25 @ 08:23  Lying BP: --/-- HR: --  Sitting BP: 102/75 HR: 79  Standing BP: 106/75 HR: 73  Site: --  Mode: --  Orthostatic VS  01-01-25 @ 08:29  Lying BP: --/-- HR: --  Sitting BP: 115/61 HR: 73  Standing BP: 90/76 HR: 77  Site: upper left arm  Mode: electronic  Orthostatic VS  12-31-24 @ 18:48  Lying BP: --/-- HR: --  Sitting BP: 107/77 HR: 80  Standing BP: 106/75 HR: 84  Site: --  Mode: --   Vital Signs Last 24 Hrs  T(C): 36.5 (01-03-25 @ 07:01), Max: 36.9 (01-02-25 @ 18:57)  T(F): 97.7 (01-03-25 @ 07:01), Max: 98.4 (01-02-25 @ 18:57)  HR: --  BP: --  BP(mean): --  RR: --  SpO2: --    Orthostatic VS  01-03-25 @ 07:01  Lying BP: --/-- HR: --  Sitting BP: 106/87 HR: 72  Standing BP: 113/78 HR: 72  Site: --  Mode: --  Orthostatic VS  01-02-25 @ 18:57  Lying BP: --/-- HR: --  Sitting BP: 101/75 HR: 81  Standing BP: 104/79 HR: 86  Site: --  Mode: --  Orthostatic VS  01-02-25 @ 08:23  Lying BP: --/-- HR: --  Sitting BP: 102/75 HR: 79  Standing BP: 106/75 HR: 73  Site: --  Mode: --

## 2025-01-03 PROCEDURE — 99233 SBSQ HOSP IP/OBS HIGH 50: CPT | Mod: GC

## 2025-01-03 RX ADMIN — ACETAMINOPHEN 650 MILLIGRAM(S): 80 SOLUTION/ DROPS ORAL at 23:43

## 2025-01-03 RX ADMIN — Medication 400 MILLIGRAM(S): at 22:18

## 2025-01-03 RX ADMIN — ACETAMINOPHEN 650 MILLIGRAM(S): 80 SOLUTION/ DROPS ORAL at 22:30

## 2025-01-03 RX ADMIN — Medication 400 MILLIGRAM(S): at 19:10

## 2025-01-03 RX ADMIN — ACETAMINOPHEN 650 MILLIGRAM(S): 80 SOLUTION/ DROPS ORAL at 17:11

## 2025-01-03 NOTE — BH INPATIENT PSYCHIATRY PROGRESS NOTE - NSBHFUPINTERVALHXFT_PSY_A_CORE
No significant interval events since last seen, Patient continues to refuse psychiatric medications and has only taken Tylenol and Motrin. No agitation, aggression and has not received any STAT IM PRNs; also has not utilized any PO PRNs. Per staff report and sleep log, pt slept in 1-2 hour intervals overnight and was pacing in the day room (pt states she slept on a chair in the dayroom due to feeling uncomfortable with her roommate).     Pt was irritable this AM stating that she has nothing to say and wants to talk more when her mother arrives for the family meeting. Maintains the belief that she  doesn't need to be here and refused to talk about delusional thinking that she mentioned yesterday during her morning evaluation. Denies SI/HI, AH/VH.     At family meeting: Mother arrived with Judaism . Pt was very irritable and talking over mother, treatment team and  in an attempt to justify her illogical thought process. Pt's mother states that the patient is different since 2023, now gets up at 1 or 2am and paces around the house, talking to herself and constantly coming in and out of the house. Mother states that the patient won't let her son sleep because she is constantly checking on him. Pt has told mother that she believes that people are following her and her family. Constantly thinks that people in cars behind her are watching her. This belief that she was being watched was the reason that she left her job at [employer] and no longer works. Pt's mother believes that she is more paranoid and more agitated than normal. The pt continued to loudly object to her mother's perspective on the situation becoming angry and believing that he mother and  "are calling me crazy". Pt seems unable to understand legal rights despite multiple different people including treatment team and  explaining these rights. Repeats " I went to school at [employer] so you should know what that means". 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 Pt then asks to speak with her psychiatrist and was reminded that she was currently speaking with her psychiatrists. Pt requested to be moved to a different team and threatened to elope with mother because "you both don't even work on this floor". Pt was successfully redirected. Pt is requesting to pursue court to argue her case and requesting to change treatment teams so that she can be discharged.  No significant interval events since last seen, Patient continues to refuse psychiatric medications and has only taken Tylenol and Motrin. No agitation, aggression and has not received any STAT IM PRNs; also has not utilized any PO PRNs. Per staff report and sleep log, pt slept in 1-2 hour intervals overnight and was pacing in the day room (pt states she slept on a chair in the dayroom due to feeling uncomfortable with her roommate).     Pt was irritable this AM stating that she has nothing to say and wants to talk more when her mother arrives for the family meeting. Maintains the belief that she  doesn't need to be here and refused to talk about suspucions that she mentioned yesterday during her morning evaluation. Denies SI/HI, AH/VH.     At family meeting: Mother arrived with [employer] . Pt was very irritable and talking over mother, treatment team and  in an attempt to justify her illogical thought process. Pt's mother states that the patient is different since 2023, now gets up at 1 or 2am and paces around the house, talking to herself and constantly coming in and out of the house. Mother states that the patient won't let her son sleep because she is constantly checking on him. Pt has told mother that she believes that people are following her and her family. Constantly thinks that people in cars behind her are watching her. This belief that she was being watched was the reason that she left her job at [employer] and no longer works. Pt's mother believes that she is more paranoid and more agitated than normal. The pt continued to loudly object to her mother's perspective on the situation becoming angry and believing that he mother and  "are calling me crazy". Pt seems unable to understand legal rights despite multiple different people including treatment team and  explaining these rights. Repeats " I went to school at [employer] so you should know what that means". Also believes that she knows better because she "used to work on the 7th floor of [employer]", and asked team to look her up. Pt then asks to speak with her psychiatrist and was reminded that she was currently speaking with her psychiatrists. Pt requested to be moved to a different team and threatened to elope with mother because "you both don't even work on this floor". Patient also notes she knows the passwords to leave. Pt was successfully redirected. Pt is requesting to pursue court to argue her case and requesting to change treatment teams so that she can be discharged.

## 2025-01-03 NOTE — BH INPATIENT PSYCHIATRY PROGRESS NOTE - NSBHASSESSSUMMFT_PSY_ALL_CORE
Patient is a 45 yo F, currently domiciled with family (parents, 16 year old son), former mental health worker, PM of obesity s/p gastric bypass in 2011 (on phentermine), PPHx of new onset psychosis with one related prior psychiatric hospitalization ([facility] in 1/2024), who was BIB police after trying to take her 15 yo son out of the home and to a shelter. This is a patient with little formal psychiatric history, besides one psychiatric hospitalization a year ago for paranoia and alcohol intoxication at [facility] (where pt declined antipsychotics and was discharged on a 3DL, though did endorse multiple paranoid delusions). She has not followed up with care or taken medications since then. Per collateral obtained from mother, patient had been at high functioning baseline prior to one+ year ago, when she became acutely paranoid, with related suspicious behaviors/statements (unplugging devices, believing she is being followed etc), culminating in agitation and desire to remove son from home to take to a shelter, leading to this ED presentation. Of note, pt began menopause about two years ago which is also around the time of onset of her symptoms.      On evaluation today, pt shared multiple experiences consistent with persecutory delusions (related to former co-workers, phone hackers, upstairs neighbors, identity thieves). Pt has little insight into her condition and is unable to express a logical line a reasoning that would explain her thought process. She is fixed in her belief that her neighbors, coworkers and "hackers" are targeting her and out to get her despite lack of proof of this. Working diagnosis is a late-onset schizophrenia-spectrum illness (given wide ranging paranoid delusions, possible AH, parallel timing to menopause) vs delusional disorder (given lack of prominent other positive symptoms) vs phentermine induced psychosis (reported SE, but appears less likely). Given the late onset of her symptoms, FEP labs ordered, all wnl. Will continue Risperdal, continue to monitor, though pt refusing. Spoke with mother to plan a family meeting for later in the week to encourage pt to accept treatment. Scheduled for Friday around 1:30.      Plan:  1. Legal: Admitted on ML3 continue 9.39 status  2. Safety: No reported SI/SIB/HI/VI currently on unit; continue routine observation.  -Haldol/Ativan PRN medications for safety/agitation  3. Psychiatric:  -continues to refuse Risperdal 1mg PO QHS; R/B/A and side effects discussed  	-Metabolic labs reviewed  4. Therapy: group & milieu therapy  5. Medical: H/o gastric bypass. Start Multivitamin. Previously on phentermine for weight loss, discontinued in the ED . Will keep medication discontinued to due known potential side effect of paranoia and psychosis   -FEP labs (RPR- neg, Vit K22--dva, folate--wnl, ABAD-neg, ceruloplasmin, ESR--wnl)  6. Collateral: Collateral from Mother, Marleni Jones [phone]   7. Disposition: When stable/pending clinical improvement    Patient is a 45 yo F, currently domiciled with family (parents, 16 year old son), former mental health worker, PM of obesity s/p gastric bypass in 2011 (on phentermine), PPHx of new onset psychosis with one related prior psychiatric hospitalization ([facility] in 1/2024), who was BIB police after trying to take her 15 yo son out of the home and to a shelter. This is a patient with little formal psychiatric history, besides one psychiatric hospitalization a year ago for paranoia and alcohol intoxication at [facility] (where pt declined antipsychotics and was discharged on a 3DL, though did endorse multiple paranoid delusions). She has not followed up with care or taken medications since then. Per collateral obtained from mother, patient had been at high functioning baseline prior to one+ year ago, when she became acutely paranoid, with related suspicious behaviors/statements (unplugging devices, believing she is being followed etc), culminating in agitation and desire to remove son from home to take to a shelter, leading to this ED presentation. Of note, pt began menopause about two years ago which is also around the time of onset of her symptoms.      On evaluation today, pt was more guarded with regard to her persecutory delusions (related to former co-workers, phone hackers, upstairs neighbors, identity thieves). Pt has little insight into her condition and is unable to express a logical line a reasoning that would explain her thought process. More irritable today particular when mother and  were expressing their concerns during the family meeting. Mother is very concerned about her well being and ability to function outside of the psychiatric unit. She is fixed in her belief that her neighbors, coworkers and "hackers" are targeting her and out to get her despite lack of proof of this. Working diagnosis is a late-onset schizophrenia-spectrum illness (given wide ranging paranoid delusions, possible AH, parallel timing to menopause) vs delusional disorder (given lack of prominent other positive symptoms) vs phentermine induced psychosis (reported SE, but appears less likely). Given the late onset of her symptoms, FEP labs ordered, all wnl. Will continue Risperdal, continue to monitor, though pt refusing. Spoke with mother and  together with the patient in order to encourage pt to accept treatment. Pt was very argumentative during this meeting and still unwilling to accept treatment. Pt would like to go to court. Will likely pursue TOO, pending family cooperation with the plan.     Plan:  1. Legal: Admitted on ML3 continue 9.39 status  2. Safety: No reported SI/SIB/HI/VI currently on unit; continue routine observation.  -Haldol/Ativan PRN medications for safety/agitation  3. Psychiatric:  -continues to refuse Risperdal 1mg PO QHS; R/B/A and side effects discussed  	-Metabolic labs reviewed  4. Therapy: group & milieu therapy  5. Medical: H/o gastric bypass. Start Multivitamin. Previously on phentermine for weight loss, discontinued in the ED . Will keep medication discontinued to due known potential side effect of paranoia and psychosis   -FEP labs (RPR- neg, Vit P61--txr, folate--wnl, ABAD-neg, ceruloplasmin, ESR--wnl)  6. Collateral: Collateral from Mother, Marleni Jones [phone]   7. Disposition: When stable/pending clinical improvement    Patient is a 43 yo F, currently domiciled with family (parents, 16 year old son), former mental health worker, PM of obesity s/p gastric bypass in 2011 (on phentermine), PPHx of new onset psychosis with one related prior psychiatric hospitalization ([facility] in 1/2024), who was BIB police after trying to take her 15 yo son out of the home and to a shelter. This is a patient with little formal psychiatric history, besides one psychiatric hospitalization a year ago for paranoia and alcohol intoxication at [facility] (where pt declined antipsychotics and was discharged on a 3DL, though did endorse multiple paranoid delusions). She has not followed up with care or taken medications since then. Per collateral obtained from mother, patient had been at high functioning baseline prior to one+ year ago, when she became acutely paranoid, with related suspicious behaviors/statements (unplugging devices, believing she is being followed etc), culminating in agitation and desire to remove son from home to take to a shelter, leading to this ED presentation. Of note, pt began menopause about two years ago which is also around the time of onset of her symptoms.      On evaluation today, pt was more guarded with regard to her suspicious thoughts (related to former co-workers, phone hackers, upstairs neighbors, identity thieves). Pt has little insight into her condition and is unable to express a logical line of reasoning that would explain her concerns. More irritable today particular when mother and  were expressing their concerns during the family meeting. Mother is very concerned about her well being and ability to function outside of the psychiatric unit. She is fixed in her belief that her neighbors, coworkers and "hackers" are targeting her and out to get her despite lack of proof of this. Working diagnosis is a late-onset schizophrenia-spectrum illness (given wide ranging paranoid delusions, possible AH, collateral of talking to self, parallel timing to menopause) vs delusional disorder (given lack of prominent other positive symptoms) vs phentermine induced psychosis (reported SE, but appears less likely). Given the late onset of her symptoms, FEP labs ordered, all wnl. Will continue Risperdal, continue to monitor, though pt refusing. Spoke with mother and  together with the patient in order to encourage pt to accept treatment. Pt was very argumentative during this meeting and still unwilling to accept treatment. Patient expressing she would like to go to court for discharge.    Plan:  1. Legal: Admitted on ML3 continue 9.39 status  2. Safety: No reported SI/SIB/HI/VI currently on unit; continue routine observation.  -Haldol/Ativan PRN medications for safety/agitation  3. Psychiatric:  -continues to refuse Risperdal 1mg PO QHS; R/B/A and side effects discussed  	-Metabolic labs reviewed  4. Therapy: group & milieu therapy  5. Medical: H/o gastric bypass. Previously on phentermine for weight loss, discontinued in the ED . Will keep medication discontinued to due known potential side effect of paranoia and psychosis   -FEP labs (RPR- neg, Vit S39--wrj, folate--wnl, ABAD-neg, ceruloplasmin, ESR--wnl)  6. Collateral: Collateral from Mother, Marleni Jones [phone]   7. Disposition: When stable/pending clinical improvement

## 2025-01-03 NOTE — BH INPATIENT PSYCHIATRY PROGRESS NOTE - MSE UNSTRUCTURED FT
Appearance: grooming-intact, wearing surgical bonnet, full coat and hospital gown on the unit   Behavior: cooperative, fairly calm though gets more upset at end of interview, no PMA/PMR  Speech: regular rate, rhythm and volume mostly, could be rapid at times  Mood: "Fine"  Affect: slightly irritable but improved compared to earlier in the week, increasingly upset and tearful towards end of interview  Thought process: coherent, though could be difficult to follow at times with illogical thought process  Thought content: +persecutory delusions elicited on interview (hacker in her and family's phone, upstairs neighbors targeting her, left work due to belief that people were whispering about her), also corroborated by mother's collateral  Perceptions: pt describes hearing noises and sounds that appear related to persecutory content, unclear if these are perceptual disturbances  Insight: poor   Judgement: poor   Cognition: grossly intact   Gait: intact Appearance: grooming-intact, wearing surgical bonnet, full coat and hospital gown on the unit   Behavior: cooperative, fairly irritable and became increasingly so during family meeting, no PMA/PMR  Speech: regular rate, rhythm and volume mostly, could be rapid at times  Mood: "Fine"  Affect: slightly irritable but improved compared to earlier in the week, increasingly upset and tearful towards end of interview  Thought process: coherent, though could be difficult to follow at times with illogical thought process  Thought content: +persecutory delusions elicited on interview (hacker in her and family's phone, upstairs neighbors targeting her, left work due to belief that people were whispering about her, believes neighbors are taking pictures of her and cars are following her), also corroborated by mother's collateral  Perceptions: pt describes hearing noises and sounds that appear related to persecutory content, unclear if these are perceptual disturbances  Insight: poor   Judgement: poor   Cognition: grossly intact   Gait: intact Appearance: grooming-intact, wearing surgical bonnet, full coat and hospital gown on the unit   Behavior: minimally cooperative, guarded, no PMA/PMR  Speech: regular rate, rhythm and volume mostly, could be rapid and loud at times when angry  Mood: "Fine"  Affect: fairly irritable and became increasingly so during family meeting  Thought process: coherent, though could be difficult to follow at times with illogical thought process  Thought content: +persecutory delusions elicited on interview (hacker in her and family's phone, upstairs neighbors targeting her, left work due to belief that people were whispering about her, believes neighbors are taking pictures of her and cars are following her), also corroborated by mother's collateral  Perceptions: pt describes hearing noises and sounds that appear related to persecutory content, unclear if these are perceptual disturbances  Insight: poor   Judgement: poor   Cognition: grossly intact   Gait: intact

## 2025-01-03 NOTE — BH INPATIENT PSYCHIATRY PROGRESS NOTE - NSBHCHARTREVIEWVS_PSY_A_CORE FT
Vital Signs Last 24 Hrs  T(C): 36.5 (01-03-25 @ 07:01), Max: 36.9 (01-02-25 @ 18:57)  T(F): 97.7 (01-03-25 @ 07:01), Max: 98.4 (01-02-25 @ 18:57)  HR: --  BP: --  BP(mean): --  RR: --  SpO2: --    Orthostatic VS  01-03-25 @ 07:01  Lying BP: --/-- HR: --  Sitting BP: 106/87 HR: 72  Standing BP: 113/78 HR: 72  Site: --  Mode: --  Orthostatic VS  01-02-25 @ 18:57  Lying BP: --/-- HR: --  Sitting BP: 101/75 HR: 81  Standing BP: 104/79 HR: 86  Site: --  Mode: --  Orthostatic VS  01-02-25 @ 08:23  Lying BP: --/-- HR: --  Sitting BP: 102/75 HR: 79  Standing BP: 106/75 HR: 73  Site: --  Mode: --

## 2025-01-03 NOTE — BH INPATIENT PSYCHIATRY PROGRESS NOTE - NSBHMETABOLIC_PSY_ALL_CORE_FT
BMI: BMI (kg/m2): 34.6 (12-29-24 @ 13:58)  HbA1c: A1C with Estimated Average Glucose Result: 5.1 % (12-31-24 @ 09:15)    Glucose:   BP: --Vital Signs Last 24 Hrs  T(C): 36.5 (01-03-25 @ 07:01), Max: 36.9 (01-02-25 @ 18:57)  T(F): 97.7 (01-03-25 @ 07:01), Max: 98.4 (01-02-25 @ 18:57)  HR: --  BP: --  BP(mean): --  RR: --  SpO2: --    Orthostatic VS  01-03-25 @ 07:01  Lying BP: --/-- HR: --  Sitting BP: 106/87 HR: 72  Standing BP: 113/78 HR: 72  Site: --  Mode: --  Orthostatic VS  01-02-25 @ 18:57  Lying BP: --/-- HR: --  Sitting BP: 101/75 HR: 81  Standing BP: 104/79 HR: 86  Site: --  Mode: --  Orthostatic VS  01-02-25 @ 08:23  Lying BP: --/-- HR: --  Sitting BP: 102/75 HR: 79  Standing BP: 106/75 HR: 73  Site: --  Mode: --    Lipid Panel: Date/Time: 12-31-24 @ 09:15  Cholesterol, Serum: 191  LDL Cholesterol Calculated: 100  HDL Cholesterol, Serum: 81  Total Cholesterol/HDL Ration Measurement: --  Triglycerides, Serum: 53

## 2025-01-04 RX ADMIN — ACETAMINOPHEN 650 MILLIGRAM(S): 80 SOLUTION/ DROPS ORAL at 18:02

## 2025-01-04 RX ADMIN — Medication 400 MILLIGRAM(S): at 14:57

## 2025-01-04 RX ADMIN — Medication 400 MILLIGRAM(S): at 13:21

## 2025-01-05 RX ADMIN — ACETAMINOPHEN 650 MILLIGRAM(S): 80 SOLUTION/ DROPS ORAL at 12:30

## 2025-01-05 RX ADMIN — ACETAMINOPHEN 650 MILLIGRAM(S): 80 SOLUTION/ DROPS ORAL at 20:31

## 2025-01-05 RX ADMIN — Medication 400 MILLIGRAM(S): at 07:23

## 2025-01-05 RX ADMIN — ACETAMINOPHEN 650 MILLIGRAM(S): 80 SOLUTION/ DROPS ORAL at 11:45

## 2025-01-05 RX ADMIN — Medication 400 MILLIGRAM(S): at 08:00

## 2025-01-06 PROCEDURE — 99232 SBSQ HOSP IP/OBS MODERATE 35: CPT | Mod: GC

## 2025-01-06 RX ADMIN — Medication 400 MILLIGRAM(S): at 07:30

## 2025-01-06 RX ADMIN — Medication 400 MILLIGRAM(S): at 10:05

## 2025-01-06 RX ADMIN — ACETAMINOPHEN 650 MILLIGRAM(S): 80 SOLUTION/ DROPS ORAL at 19:10

## 2025-01-06 NOTE — BH INPATIENT PSYCHIATRY PROGRESS NOTE - NSBHFUPINTERVALHXFT_PSY_A_CORE
No significant interval events since last seen, Patient continues to refuse psychiatric medications and has only taken Tylenol and Motrin. No agitation, aggression and has not received any STAT IM PRNs; also has not utilized any PO PRNs.  No significant interval events since last seen, Patient continues to refuse psychiatric medications and has only taken Tylenol and Motrin. No agitation, aggression noted on the unit. Pt seen and evaluated in her room. On evaluation this AM pt was irritable particularly when asked about her feelings around the family meeting on Friday. Pt reports that she "has no interest in talking about the past". Pt reports that's she is leaving today despite being told that she cannot leave the unit. Pt abruptly terminated interview, put on her coat and walked out the room mid interview with a bag of her belongings. Pt found standing by nursing station. Denies SI,HI, AH/VH.  VSS, no significant interval events since last seen, Patient continues to refuse psychiatric medications and has only taken Tylenol and Motrin. No agitation, aggression noted on the unit. Pt seen and evaluated in her room. On evaluation this AM pt was irritable particularly when asked about her feelings around the family meeting on Friday. Pt reports that she "has no interest in talking about the past". Pt reports that's she is leaving today despite being told that she cannot leave the unit. Pt abruptly terminated interview, put on her coat and walked out the room mid interview with a bag of her belongings. Pt found standing by nursing station. Denies SI,HI, AH/VH.

## 2025-01-06 NOTE — BH INPATIENT PSYCHIATRY PROGRESS NOTE - CURRENT MEDICATION
MEDICATIONS  (STANDING):  risperiDONE   Tablet 1 milliGRAM(s) Oral at bedtime    MEDICATIONS  (PRN):  acetaminophen     Tablet .. 650 milliGRAM(s) Oral every 6 hours PRN Mild Pain (1 - 3), Moderate Pain (4 - 6)  haloperidol     Tablet 5 milliGRAM(s) Oral every 6 hours PRN Agitation 2/2 psychosis  haloperidol    Injectable 5 milliGRAM(s) IntraMuscular once PRN Agitation 2/2 psychosis  ibuprofen  Tablet. 400 milliGRAM(s) Oral every 6 hours PRN Mild Pain (1 - 3), Moderate Pain (4 - 6)  LORazepam     Tablet 2 milliGRAM(s) Oral every 6 hours PRN Agitation 2/2 psychosis or severe anxiety  LORazepam   Injectable 2 milliGRAM(s) IntraMuscular once PRN Agitation 2/2 psychosis  melatonin. 3 milliGRAM(s) Oral at bedtime PRN Insomnia

## 2025-01-06 NOTE — BH INPATIENT PSYCHIATRY PROGRESS NOTE - NSBHCHARTREVIEWVS_PSY_A_CORE FT
Vital Signs Last 24 Hrs  T(C): 36.7 (01-06-25 @ 08:09), Max: 36.7 (01-06-25 @ 08:09)  T(F): 98.1 (01-06-25 @ 08:09), Max: 98.1 (01-06-25 @ 08:09)  HR: --  BP: --  BP(mean): --  RR: --  SpO2: --    Orthostatic VS  01-06-25 @ 08:09  Lying BP: --/-- HR: --  Sitting BP: 114/96 HR: 61  Standing BP: 107/79 HR: 69  Site: upper left arm  Mode: electronic  Orthostatic VS  01-05-25 @ 08:14  Lying BP: --/-- HR: --  Sitting BP: 103/69 HR: 80  Standing BP: 112/80 HR: 76  Site: --  Mode: --

## 2025-01-06 NOTE — BH INPATIENT PSYCHIATRY PROGRESS NOTE - NSBHMETABOLIC_PSY_ALL_CORE_FT
BMI: BMI (kg/m2): 34.6 (12-29-24 @ 13:58)  HbA1c: A1C with Estimated Average Glucose Result: 5.1 % (12-31-24 @ 09:15)    Glucose:   BP: --Vital Signs Last 24 Hrs  T(C): 36.7 (01-06-25 @ 08:09), Max: 36.7 (01-06-25 @ 08:09)  T(F): 98.1 (01-06-25 @ 08:09), Max: 98.1 (01-06-25 @ 08:09)  HR: --  BP: --  BP(mean): --  RR: --  SpO2: --    Orthostatic VS  01-06-25 @ 08:09  Lying BP: --/-- HR: --  Sitting BP: 114/96 HR: 61  Standing BP: 107/79 HR: 69  Site: upper left arm  Mode: electronic  Orthostatic VS  01-05-25 @ 08:14  Lying BP: --/-- HR: --  Sitting BP: 103/69 HR: 80  Standing BP: 112/80 HR: 76  Site: --  Mode: --    Lipid Panel: Date/Time: 12-31-24 @ 09:15  Cholesterol, Serum: 191  LDL Cholesterol Calculated: 100  HDL Cholesterol, Serum: 81  Total Cholesterol/HDL Ration Measurement: --  Triglycerides, Serum: 53

## 2025-01-06 NOTE — BH INPATIENT PSYCHIATRY PROGRESS NOTE - MSE UNSTRUCTURED FT
Appearance: grooming-intact, wearing surgical bonnet, full coat and hospital gown on the unit   Behavior: minimally cooperative, guarded, no PMA/PMR  Speech: regular rate, rhythm and volume mostly, could be rapid and loud at times when angry  Mood: "Fine"  Affect: fairly irritable and became increasingly so during family meeting  Thought process: coherent, though could be difficult to follow at times with illogical thought process  Thought content: +persecutory delusions elicited on interview (hacker in her and family's phone, upstairs neighbors targeting her, left work due to belief that people were whispering about her, believes neighbors are taking pictures of her and cars are following her), also corroborated by mother's collateral  Perceptions: pt describes hearing noises and sounds that appear related to persecutory content, unclear if these are perceptual disturbances  Insight: poor   Judgement: poor   Cognition: grossly intact   Gait: intact Appearance: grooming-intact, wearing surgical bonnet, full coat and hospital gown on the unit   Behavior: minimally cooperative, guarded, no PMA/PMR  Speech: regular rate, rhythm and volume mostly, could be rapid and loud at times when angry  Mood: "Fine"  Affect: fairly irritable  Thought process: coherent, though could be difficult to follow at times with illogical thought process, unable to understand her legal status despite her being very intelligent and high functioning  Thought content: +persecutory delusions elicited on interview (hacker in her and family's phone, upstairs neighbors targeting her, left work due to belief that people were whispering about her, believes neighbors are taking pictures of her and cars are following her), also corroborated by mother's collateral  Perceptions: pt describes hearing noises and sounds that appear related to persecutory content, unclear if these are perceptual disturbances  Insight: poor   Judgement: poor   Cognition: grossly intact   Gait: intact Appearance: grooming-intact, wearing surgical bonnet, full coat and hospital gown on the unit   Behavior: minimally cooperative, guarded, no PMA/PMR  Speech: regular rate, rhythm and volume mostly, could be rapid and loud at times when angry  Mood: "Fine"  Affect: fairly irritable  Thought process: coherent, though could be difficult to follow at times with illogical thought process, unable to understand her legal status despite multiple explanations  Thought content: +persecutory delusions elicited on interview (hacker in her and family's phone, upstairs neighbors targeting her, left work due to belief that people were whispering about her, believes neighbors are taking pictures of her and cars are following her), also corroborated by mother's collateral  Perceptions: pt describes hearing noises and sounds that appear related to persecutory content, unclear if these are perceptual disturbances  Insight: poor   Judgement: poor   Cognition: grossly intact   Gait: intact

## 2025-01-06 NOTE — BH INPATIENT PSYCHIATRY PROGRESS NOTE - NSBHASSESSSUMMFT_PSY_ALL_CORE
Patient is a 43 yo F, currently domiciled with family (parents, 16 year old son), former mental health worker, PM of obesity s/p gastric bypass in 2011 (on phentermine), PPHx of new onset psychosis with one related prior psychiatric hospitalization ([facility] in 1/2024), who was BIB police after trying to take her 15 yo son out of the home and to a shelter. This is a patient with little formal psychiatric history, besides one psychiatric hospitalization a year ago for paranoia and alcohol intoxication at [facility] (where pt declined antipsychotics and was discharged on a 3DL, though did endorse multiple paranoid delusions). She has not followed up with care or taken medications since then. Per collateral obtained from mother, patient had been at high functioning baseline prior to one+ year ago, when she became acutely paranoid, with related suspicious behaviors/statements (unplugging devices, believing she is being followed etc), culminating in agitation and desire to remove son from home to take to a shelter, leading to this ED presentation. Of note, pt began menopause about two years ago which is also around the time of onset of her symptoms.      On evaluation today, pt was more guarded with regard to her suspicious thoughts (related to former co-workers, phone hackers, upstairs neighbors, identity thieves). Pt has little insight into her condition and is unable to express a logical line of reasoning that would explain her concerns. More irritable today particular when mother and  were expressing their concerns during the family meeting. Mother is very concerned about her well being and ability to function outside of the psychiatric unit. She is fixed in her belief that her neighbors, coworkers and "hackers" are targeting her and out to get her despite lack of proof of this. Working diagnosis is a late-onset schizophrenia-spectrum illness (given wide ranging paranoid delusions, possible AH, collateral of talking to self, parallel timing to menopause) vs delusional disorder (given lack of prominent other positive symptoms) vs phentermine induced psychosis (reported SE, but appears less likely). Given the late onset of her symptoms, FEP labs ordered, all wnl. Will continue Risperdal, continue to monitor, though pt refusing. Spoke with mother and  together with the patient in order to encourage pt to accept treatment. Pt was very argumentative during this meeting and still unwilling to accept treatment. Patient expressing she would like to go to court for discharge.    Plan:  1. Legal: Admitted on ML3 continue 9.39 status  2. Safety: No reported SI/SIB/HI/VI currently on unit; continue routine observation.  -Haldol/Ativan PRN medications for safety/agitation  3. Psychiatric:  -continues to refuse Risperdal 1mg PO QHS; R/B/A and side effects discussed  	-Metabolic labs reviewed  4. Therapy: group & milieu therapy  5. Medical: H/o gastric bypass. Previously on phentermine for weight loss, discontinued in the ED . Will keep medication discontinued to due known potential side effect of paranoia and psychosis   -FEP labs (RPR- neg, Vit Y95--qms, folate--wnl, ABAD-neg, ceruloplasmin, ESR--wnl)  6. Collateral: Collateral from Mother, Marleni Jones [phone]   7. Disposition: When stable/pending clinical improvement    Patient is a 45 yo F, currently domiciled with family (parents, 16 year old son), former mental health worker, PM of obesity s/p gastric bypass in 2011 (on phentermine), PPHx of new onset psychosis with one related prior psychiatric hospitalization ([facility] in 1/2024), who was BIB police after trying to take her 17 yo son out of the home and to a shelter. This is a patient with little formal psychiatric history, besides one psychiatric hospitalization a year ago for paranoia and alcohol intoxication at [facility] (where pt declined antipsychotics and was discharged on a 3DL, though did endorse multiple paranoid delusions). She has not followed up with care or taken medications since then. Per collateral obtained from mother, patient had been at high functioning baseline prior to one+ year ago, when she became acutely paranoid, with related suspicious behaviors/statements (unplugging devices, believing she is being followed etc), culminating in agitation and desire to remove son from home to take to a shelter, leading to this ED presentation. Of note, pt began menopause about two years ago which is also around the time of onset of her symptoms.      On evaluation today, pt continues to be guarded with regard to her suspicious thoughts (related to former co-workers, phone hackers, upstairs neighbors, identity thieves) and is now refusing to speak on the matter. She still has little insight into her condition and is unable to express a logical line of reasoning that would explain her concerns. Although she is intelligent and high functioning, she is unable to understand her legal status despite this being explained multiple time by treatment team and . She insists that she is on a "formal informal" status and continuously denies being on an involuntary status. Working diagnosis is a late-onset schizophrenia-spectrum illness (given wide ranging paranoid delusions, possible AH, collateral of talking to self, parallel timing to menopause) vs delusional disorder (given lack of prominent other positive symptoms) vs phentermine induced psychosis (reported SE, but appears less likely). Given the late onset of her symptoms, FEP labs ordered, all wnl. Will continue Risperdal, continue to monitor, though pt refusing. Will be filing for TOO as there is concern from both family and treatment team about patient's ability to function safely at home.    Plan:  1. Legal: Admitted on ML3 continue 9.39 status  2. Safety: No reported SI/SIB/HI/VI currently on unit; continue routine observation.  -Haldol/Ativan PRN medications for safety/agitation  3. Psychiatric:  -continues to refuse Risperdal 1mg PO QHS; R/B/A and side effects discussed  	-Metabolic labs reviewed  4. Therapy: group & milieu therapy  5. Medical: H/o gastric bypass. Previously on phentermine for weight loss, discontinued in the ED . Will keep medication discontinued to due known potential side effect of paranoia and psychosis   -FEP labs (RPR- neg, Vit F10--eyi, folate--wnl, ABAD-neg, ceruloplasmin-wnl, ESR--wnl)  6. Collateral: Collateral from Mother, Marleni Jones [phone]   7. Disposition: When stable/pending clinical improvement, pending TOO trial   Patient is a 45 yo F, currently domiciled with family (parents, 16 year old son), former mental health worker, [facility] of obesity s/p gastric bypass in 2011 (on phentermine), PPHx of new onset psychosis with one related prior psychiatric hospitalization ([facility] in 1/2024), who was BIB police after trying to take her 15 yo son out of the home and to a shelter. This is a patient with little formal psychiatric history, besides one psychiatric hospitalization a year ago for paranoia and alcohol intoxication at [facility] (where pt declined antipsychotics and was discharged on a 3DL, though did endorse multiple paranoid delusions). She has not followed up with care or taken medications since then. Per collateral obtained from mother, patient had been at high functioning baseline prior to one+ year ago, when she became acutely paranoid, with related suspicious behaviors/statements (unplugging devices, believing she is being followed etc), culminating in agitation and desire to remove son from home to take to a shelter, leading to this ED presentation. Of note, pt began menopause about two years ago which is also around the time of onset of her symptoms.      On evaluation today, pt continues to be guarded with regard to her suspicious thoughts (related to former co-workers, phone hackers, upstairs neighbors, identity thieves) and is now refusing to speak on the matter. She still has little insight into her condition and is unable to express a logical line of reasoning that would explain her concerns. Although generally knowledgeable about the health system, she is unable to understand her legal status despite this being explained multiple time by treatment team,  and staff. She insists that she is on a "formal informal" status and continuously denies being on an involuntary status. Working diagnosis is a late-onset schizophrenia-spectrum illness (given wide ranging paranoid delusions, possible AH, collateral of talking to self, parallel timing to menopause) vs delusional disorder (given lack of prominent other positive symptoms). Phentermine induced psychosis less likely given symptoms have persisted despite stopping medication. Given the late onset of her symptoms, FEP labs ordered, all wnl. Will continue Risperdal, continue to monitor, though pt refusing. Consider TOO as there is concern from both family and treatment team about patient's ability to function safely at home.    Plan:  1. Legal: Admitted on ML3 continue 9.39 status  2. Safety: No reported SI/SIB/HI/VI currently on unit; continue routine observation.  -Haldol/Ativan PRN medications for safety/agitation  3. Psychiatric:  -continues to refuse Risperdal 1mg PO QHS; R/B/A and side effects discussed  	-Metabolic labs reviewed  4. Therapy: group & milieu therapy  5. Medical: H/o gastric bypass. Previously on phentermine for weight loss, discontinued in the ED . Will keep medication discontinued to due known potential side effect of paranoia and psychosis   -FEP labs (RPR- neg, Vit Y94--xab, folate--wnl, ABAD-neg, ceruloplasmin-wnl, ESR--wnl)  6. Collateral: Collateral from Mother, Marleni Jones [phone]   7. Disposition: When stable/pending clinical improvement

## 2025-01-07 DIAGNOSIS — G43.909 MIGRAINE, UNSPECIFIED, NOT INTRACTABLE, WITHOUT STATUS MIGRAINOSUS: ICD-10-CM

## 2025-01-07 DIAGNOSIS — Z87.898 PERSONAL HISTORY OF OTHER SPECIFIED CONDITIONS: ICD-10-CM

## 2025-01-07 PROCEDURE — 99232 SBSQ HOSP IP/OBS MODERATE 35: CPT | Mod: GC

## 2025-01-07 PROCEDURE — 99223 1ST HOSP IP/OBS HIGH 75: CPT

## 2025-01-07 RX ORDER — ACETAMINOPHEN 80 MG/.8ML
650 SOLUTION/ DROPS ORAL ONCE
Refills: 0 | Status: COMPLETED | OUTPATIENT
Start: 2025-01-07 | End: 2025-01-07

## 2025-01-07 RX ADMIN — ACETAMINOPHEN 650 MILLIGRAM(S): 80 SOLUTION/ DROPS ORAL at 21:35

## 2025-01-07 RX ADMIN — ACETAMINOPHEN 650 MILLIGRAM(S): 80 SOLUTION/ DROPS ORAL at 09:10

## 2025-01-07 RX ADMIN — Medication 400 MILLIGRAM(S): at 08:00

## 2025-01-07 RX ADMIN — ACETAMINOPHEN 650 MILLIGRAM(S): 80 SOLUTION/ DROPS ORAL at 22:35

## 2025-01-07 NOTE — BH INPATIENT PSYCHIATRY PROGRESS NOTE - NSBHMETABOLIC_PSY_ALL_CORE_FT
BMI: BMI (kg/m2): 34.6 (12-29-24 @ 13:58)  HbA1c: A1C with Estimated Average Glucose Result: 5.1 % (12-31-24 @ 09:15)    Glucose:   BP: --Vital Signs Last 24 Hrs  T(C): 37 (01-07-25 @ 08:50), Max: 37 (01-07-25 @ 08:50)  T(F): 98.6 (01-07-25 @ 08:50), Max: 98.6 (01-07-25 @ 08:50)  HR: --  BP: --  BP(mean): --  RR: --  SpO2: --    Orthostatic VS  01-07-25 @ 08:50  Lying BP: --/-- HR: --  Sitting BP: 110/76 HR: 68  Standing BP: 115/88 HR: 79  Site: --  Mode: --  Orthostatic VS  01-06-25 @ 08:09  Lying BP: --/-- HR: --  Sitting BP: 114/96 HR: 61  Standing BP: 107/79 HR: 69  Site: upper left arm  Mode: electronic    Lipid Panel: Date/Time: 12-31-24 @ 09:15  Cholesterol, Serum: 191  LDL Cholesterol Calculated: 100  HDL Cholesterol, Serum: 81  Total Cholesterol/HDL Ration Measurement: --  Triglycerides, Serum: 53

## 2025-01-07 NOTE — BH INPATIENT PSYCHIATRY PROGRESS NOTE - NSBHCHARTREVIEWVS_PSY_A_CORE FT
Vital Signs Last 24 Hrs  T(C): 37 (01-07-25 @ 08:50), Max: 37 (01-07-25 @ 08:50)  T(F): 98.6 (01-07-25 @ 08:50), Max: 98.6 (01-07-25 @ 08:50)  HR: --  BP: --  BP(mean): --  RR: --  SpO2: --    Orthostatic VS  01-07-25 @ 08:50  Lying BP: --/-- HR: --  Sitting BP: 110/76 HR: 68  Standing BP: 115/88 HR: 79  Site: --  Mode: --  Orthostatic VS  01-06-25 @ 08:09  Lying BP: --/-- HR: --  Sitting BP: 114/96 HR: 61  Standing BP: 107/79 HR: 69  Site: upper left arm  Mode: electronic

## 2025-01-07 NOTE — BH INPATIENT PSYCHIATRY PROGRESS NOTE - NSBHFUPINTERVALHXFT_PSY_A_CORE
Pt evaluated this AM. Seen walking around the unit with winter coat on. Pt irritable during interview stating that she knows her rights and insisting that's he should be discharged because she put in for a 3 day letter. Explained that she is on an involuntary status, pt then became argumentative and refused to talk about her current symptoms. Pt states that the writer is responsible for her admission here. Pt looked at writer and stated "you were the psychiatrist who saw me in the emergency room and put me in here!" and was fixed in this belief despite being told that writer has never worked in the ED. Pt terminated interview by getting up mid conversation and refused to engage with the treatment team. Became irritable in the afternoon after staff attempted a room assignment to accommodate a new patient. States that "this is against the law".    Per staff, pt became irritable yesterday evening, put on her coat and demanded that she be released due to her 3DL. Pt was demanding her wallet so that she could call her son's school to help her get discharged from the unit. When it was explained that this is not possible pt became upset and disrespectful to nursing staff and demanded discharge. Pt also threatened to call state and quality to report the staff.  Pt evaluated this AM. Seen walking around the unit with winter coat on. Pt irritable during interview stating that she knows her rights and insisting that's he should be discharged because she put in for a 3 day letter. Explained that she is on an involuntary status, pt then became argumentative and refused to talk about her current symptoms. Pt states that the writer is responsible for her admission here. Pt looked at writer and stated "you were the psychiatrist who saw me in the emergency room and put me in here!" and was fixed in this belief despite being told that writer has never worked in the ED. Pt endorsed headache for the last few days that have been requiring ibuprofen and acetaminophen without relief. Pt terminated interview by getting up mid conversation and refused to engage with the treatment team. Became irritable in the afternoon after staff attempted a room assignment to accommodate a new patient. States that "this is against the law".    Per staff, pt became irritable yesterday evening, put on her coat and demanded that she be released due to her 3DL. Pt was demanding her wallet so that she could call her son's school to help her get discharged from the unit. When it was explained that this is not possible pt became upset and disrespectful to nursing staff and demanded discharge. Pt also threatened to call state and quality to report the staff.

## 2025-01-07 NOTE — BH INPATIENT PSYCHIATRY PROGRESS NOTE - NSBHASSESSSUMMFT_PSY_ALL_CORE
Patient is a 45 yo F, currently domiciled with family (parents, 16 year old son), former mental health worker, [facility] of obesity s/p gastric bypass in 2011 (on phentermine), PPHx of new onset psychosis with one related prior psychiatric hospitalization ([facility] in 1/2024), who was BIB police after trying to take her 15 yo son out of the home and to a shelter. This is a patient with little formal psychiatric history, besides one psychiatric hospitalization a year ago for paranoia and alcohol intoxication at [facility] (where pt declined antipsychotics and was discharged on a 3DL, though did endorse multiple paranoid delusions). She has not followed up with care or taken medications since then. Per collateral obtained from mother, patient had been at high functioning baseline prior to one+ year ago, when she became acutely paranoid, with related suspicious behaviors/statements (unplugging devices, believing she is being followed etc), culminating in agitation and desire to remove son from home to take to a shelter, leading to this ED presentation. Of note, pt began menopause about two years ago which is also around the time of onset of her symptoms.      On evaluation today, pt continues to be guarded with regard to her suspicious thoughts (related to former co-workers, phone hackers, upstairs neighbors, identity thieves) and is now refusing to speak on the matter. She still has little insight into her condition and is unable to express a logical line of reasoning that would explain her concerns. Although generally knowledgeable about the health system, she is unable to understand her legal status despite this being explained multiple time by treatment team,  and staff. She insists that she is on a "formal informal" status and continuously denies being on an involuntary status. Working diagnosis is a late-onset schizophrenia-spectrum illness (given wide ranging paranoid delusions, possible AH, collateral of talking to self, parallel timing to menopause) vs delusional disorder (given lack of prominent other positive symptoms). Phentermine induced psychosis less likely given symptoms have persisted despite stopping medication. Given the late onset of her symptoms, FEP labs ordered, all wnl. Will continue Risperdal, continue to monitor, though pt refusing. Consider TOO as there is concern from both family and treatment team about patient's ability to function safely at home.    Plan:  1. Legal: Admitted on ML3 continue 9.39 status  2. Safety: No reported SI/SIB/HI/VI currently on unit; continue routine observation.  -Haldol/Ativan PRN medications for safety/agitation  3. Psychiatric:  -continues to refuse Risperdal 1mg PO QHS; R/B/A and side effects discussed  	-Metabolic labs reviewed  4. Therapy: group & milieu therapy  5. Medical: H/o gastric bypass. Previously on phentermine for weight loss, discontinued in the ED . Will keep medication discontinued to due known potential side effect of paranoia and psychosis   -FEP labs (RPR- neg, Vit Q88--seb, folate--wnl, ABAD-neg, ceruloplasmin-wnl, ESR--wnl)  6. Collateral: Collateral from Mother, Marleni Jones [phone]   7. Disposition: When stable/pending clinical improvement   Patient is a 45 yo F, currently domiciled with family (parents, 16 year old son), former mental health worker, PM of obesity s/p gastric bypass in 2011 (on phentermine), PPHx of new onset psychosis with one related prior psychiatric hospitalization ([facility] in 1/2024), who was BIB police after trying to take her 17 yo son out of the home and to a shelter. This is a patient with little formal psychiatric history, besides one psychiatric hospitalization a year ago for paranoia and alcohol intoxication at [facility] (where pt declined antipsychotics and was discharged on a 3DL, though did endorse multiple paranoid delusions). She has not followed up with care or taken medications since then. Per collateral obtained from mother, patient had been at high functioning baseline prior to one+ year ago, when she became acutely paranoid, with related suspicious behaviors/statements (unplugging devices, believing she is being followed etc), culminating in agitation and desire to remove son from home to take to a shelter, leading to this ED presentation. Of note, pt began menopause about two years ago which is also around the time of onset of her symptoms.      On evaluation today, pt continues to be guarded with regard to her suspicious thoughts (related to former co-workers, phone hackers, upstairs neighbors, identity thieves) and is now refusing to speak on the matter. She has also become increasingly more irritable with staff. She still has little insight into her condition and is unable to express a logical line of reasoning that would explain her concerns. Per collateral collected from mother, family is concerned about her ability to maintain safety for herself, her son and bed bound father at this point. Mother uncomfortable with the patient living with her currently. Pt is under the impression that she stable enough to move to a hotel or shelter with her son. Working diagnosis is a late-onset schizophrenia-spectrum illness (given wide ranging paranoid delusions, possible AH, collateral of talking to self, parallel timing to menopause) vs delusional disorder (given lack of prominent other positive symptoms). Will continue Risperdal, continue to monitor, though pt refusing. Filing for TOO as there is concern from both family and treatment team about patient's ability to function safely at home.    Plan:  1. Legal: Admitted on ML3 continue 9.39 status  2. Safety: No reported SI/SIB/HI/VI currently on unit; continue routine observation.  -Haldol/Ativan PRN medications for safety/agitation  3. Psychiatric:  -continues to refuse Risperdal 1mg PO QHS; R/B/A and side effects discussed  	-Metabolic labs reviewed  4. Therapy: group & milieu therapy  5. Medical: H/o gastric bypass. Previously on phentermine for weight loss, discontinued in the ED . Will keep medication discontinued to due known potential side effect of paranoia and psychosis   -FEP labs (RPR- neg, Vit G03--ftc, folate--wnl, ABAD-neg, ceruloplasmin-wnl, ESR--wnl)  6. Collateral: Collateral from Mother, Marleni Jones [phone]   7. 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 This is a patient with little formal psychiatric history, besides one psychiatric hospitalization a year ago for paranoia and alcohol intoxication at [facility] (where pt declined antipsychotics and was discharged on a 3DL, though did endorse multiple paranoid delusions). She has not followed up with care or taken medications since then. Per collateral obtained from mother, patient had been at high functioning baseline prior to one+ year ago, when she became acutely paranoid, with related suspicious behaviors/statements (unplugging devices, believing she is being followed etc), culminating in agitation and desire to remove son from home to take to a shelter, leading to this ED presentation. Of note, pt began menopause about two years ago which is also around the time of onset of her symptoms.      On evaluation today, pt continues to be guarded with regard to her suspicious thoughts (related to former co-workers, phone hackers, upstairs neighbors, identity thieves) and is now refusing to speak on the matter. She has also become increasingly more irritable with staff. She still has little insight into her condition and is unable to express a logical line of reasoning that would explain her concerns. Per collateral collected from mother, family is concerned about her ability to maintain safety for herself, her son and bed bound father at this point. Mother uncomfortable with the patient living with her currently. Pt is under the impression that she stable enough to move to a hotel or shelter with her son. Working diagnosis is a late-onset schizophrenia-spectrum illness (given wide ranging paranoid delusions, possible AH, collateral of talking to self, parallel timing to menopause) vs delusional disorder (given lack of prominent other positive symptoms). Will continue Risperdal, continue to monitor, though pt refusing. Filing for TOO as there is concern from both family and treatment team about patient's ability to function safely at home. Consulted medicine for headache, appreciate recs.    Plan:  1. Legal: Admitted on ML3 continue 9.39 status  2. Safety: No reported SI/SIB/HI/VI currently on unit; continue routine observation.  -Haldol/Ativan PRN medications for safety/agitation  3. Psychiatric:  -continues to refuse Risperdal 1mg PO QHS; R/B/A and side effects discussed  	-Metabolic labs reviewed  4. Therapy: group & milieu therapy  5. Medical: H/o gastric bypass. Previously on phentermine for weight loss, discontinued in the ED . Will keep medication discontinued to due known potential side effect of paranoia and psychosis. Medicine consulted for headache, appreciate recs   -FEP labs (RPR- neg, Vit Q75--jta, folate--wnl, ABAD-neg, ceruloplasmin-wnl, ESR--wnl)  6. Collateral: Collateral from Mother, Marleni Jones [phone]   7. 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 in 1/2024), who was BIB police after trying to take her 15 yo son out of the home and to a shelter. This is a patient with little formal psychiatric history, besides one psychiatric hospitalization a year ago for paranoia and alcohol intoxication at [facility] (where pt declined antipsychotics and was discharged on a 3DL, though did endorse multiple paranoid delusions). She has not followed up with care or taken medications since then. Per collateral obtained from mother, patient had been at high functioning baseline prior to one+ year ago, when she became acutely paranoid, with related suspicious behaviors/statements (unplugging devices, believing she is being followed etc), culminating in agitation and desire to remove son from home to take to a shelter, leading to this ED presentation. Of note, pt began menopause about two years ago which is also around the time of onset of her symptoms.      On evaluation today, pt continues to be guarded with regard to her suspicious thoughts (related to former co-workers, phone hackers, upstairs neighbors, identity thieves) and is now refusing to speak on the matter. She has also become increasingly more irritable with staff. She still has little insight into her condition and is unable to express a logical line of reasoning that would explain her concerns. Per collateral collected from mother, family is concerned about her ability to maintain safety for herself, her son and bed bound father at this point. Mother uncomfortable with the patient living with her currently. Pt is under the impression that she stable enough to move to a hotel or shelter with her son, and state she can go to court to take her son and father with her. Working diagnosis is a late-onset schizophrenia-spectrum illness (given wide ranging paranoid delusions, possible AH, collateral of talking to self, parallel timing to menopause) vs delusional disorder (given lack of prominent other positive symptoms). Will continue Risperdal, continue to monitor, though pt refusing. Filing for TOO as there is concern from both family and treatment team about patient's ability to function safely at home. Consulted medicine for headache, appreciate recs, will discuss recommendation with patient for head imaging more tomorrow.    Plan:  1. Legal: Admitted on ML3 continue 9.39 status --> converted to 9.27 1/6, TOO hearing 1/8  2. Safety: No reported SI/SIB/HI/VI currently on unit; continue routine observation.  -Haldol/Ativan PRN medications for safety/agitation  3. Psychiatric:  -continues to refuse Risperdal 1mg PO QHS; R/B/A and side effects discussed  	-Metabolic labs reviewed  4. Therapy: group & milieu therapy  5. Medical: H/o gastric bypass. Previously on phentermine for weight loss, discontinued in the ED . Will keep medication discontinued to due known potential side effect of paranoia and psychosis. Medicine consulted for headache, appreciate recs   -FEP labs (RPR- neg, Vit B84--eej, folate--wnl, ABAD-neg, ceruloplasmin-wnl, ESR--wnl)  6. Collateral: Collateral from Mother, Marleni Jonse [phone]   7. Disposition: When stable/pending clinical improvement

## 2025-01-07 NOTE — CONSULT NOTE ADULT - ASSESSMENT
44 y.o. F w/ a hx of obesity s/p gastric bypass c/b pernicious anemia who presents for delusions, medicine consulted for headaches.     # Headaches   - New onset about 5-6 months ago, no migraine like sx. Sx appear c/w tension headache though given onset in 40's, would recommend CNS imaging- ideally with MRI brain but CTH would suffice in the interim. D/w patient who refused CNS imaging, reporting she is claustrophobic and needs an open MRI   - If patient willing to undergo, would recommend MRI brain   - Patient using Tylenol 1-2x/day and Ibuprofen daily, reports headaches have been occurring for months. C/f medication over use headaches. D/C Tylenol and Motrin and monitor to see if HA resolve.   - If HA do not resolve, recommend Neurology consult for headache     # Delusions   - Management per primary

## 2025-01-07 NOTE — BH INPATIENT PSYCHIATRY PROGRESS NOTE - MSE UNSTRUCTURED FT
Appearance: grooming-intact, wearing surgical bonnet, full coat and hospital gown on the unit   Behavior: minimally cooperative, guarded, no PMA/PMR  Speech: regular rate, rhythm and volume mostly, could be rapid and loud at times when angry  Mood: "Fine"  Affect: fairly irritable  Thought process: coherent, though could be difficult to follow at times with illogical thought process, unable to understand her legal status despite multiple explanations  Thought content: +persecutory delusions elicited on interview (hacker in her and family's phone, upstairs neighbors targeting her, left work due to belief that people were whispering about her, believes neighbors are taking pictures of her and cars are following her), also corroborated by mother's collateral  Perceptions: pt describes hearing noises and sounds that appear related to persecutory content, unclear if these are perceptual disturbances  Insight: poor   Judgement: poor   Cognition: grossly intact   Gait: intact Appearance: grooming-intact, wearing surgical bonnet, full coat and hospital gown on the unit   Behavior: minimally cooperative, refusing to speak with treatment team and abruptly terminating interviews, guarded, no PMA/PMR  Speech: regular rate, rhythm and volume mostly, could be rapid and loud at times when angry  Mood: "Fine"  Affect: increasingly irritable as conversation progressed   Thought process: coherent, though could be difficult to follow at times with illogical thought process, unable to understand her legal status despite multiple explanations  Thought content: +persecutory delusions elicited on interview (believes member of treatment team saw her in the ED and admitted her here, hacker in her and family's phone, upstairs neighbors targeting her, left work due to belief that people were whispering about her, believes neighbors are taking pictures of her and cars are following her), also corroborated by mother's collateral  Perceptions: pt describes hearing noises and sounds that appear related to persecutory content, unclear if these are perceptual disturbances  Insight: poor   Judgement: poor   Cognition: grossly intact   Gait: intact Appearance: grooming-intact, wearing surgical bonnet, full coat and hospital gown on the unit   Behavior: minimally cooperative, at some point refusing to speak with treatment team and abruptly terminating interviews, guarded, no PMA/PMR  Speech: regular rate, rhythm and volume mostly, could be rapid and loud at times when angry  Mood: "Fine"  Affect: increasingly irritable as conversation progressed   Thought process: coherent, though could be difficult to follow at times with illogical thought process, unable to understand her legal status despite multiple explanations  Thought content: +persecutory delusions elicited on interview (believes member of treatment team saw her in the ED and admitted her here, hacker in her and family's phone, upstairs neighbors targeting her, left work due to belief that people were whispering about her, believes neighbors are taking pictures of her and cars are following her), also corroborated by mother's collateral  Perceptions: pt describes hearing noises and sounds that appear related to persecutory content, unclear if these are perceptual disturbances  Insight: poor   Judgement: poor   Cognition: grossly intact   Gait: intact

## 2025-01-07 NOTE — BH INPATIENT PSYCHIATRY PROGRESS NOTE - PRN MEDS
MEDICATIONS  (PRN):  acetaminophen     Tablet .. 650 milliGRAM(s) Oral every 6 hours PRN Mild Pain (1 - 3), Moderate Pain (4 - 6)  haloperidol     Tablet 5 milliGRAM(s) Oral every 6 hours PRN Agitation 2/2 psychosis  haloperidol    Injectable 5 milliGRAM(s) IntraMuscular once PRN Agitation 2/2 psychosis  ibuprofen  Tablet. 400 milliGRAM(s) Oral every 6 hours PRN Mild Pain (1 - 3), Moderate Pain (4 - 6)  melatonin. 3 milliGRAM(s) Oral at bedtime PRN Insomnia   MEDICATIONS  (PRN):  haloperidol     Tablet 5 milliGRAM(s) Oral every 6 hours PRN Agitation 2/2 psychosis  haloperidol    Injectable 5 milliGRAM(s) IntraMuscular once PRN Agitation 2/2 psychosis  melatonin. 3 milliGRAM(s) Oral at bedtime PRN Insomnia

## 2025-01-08 PROCEDURE — 99232 SBSQ HOSP IP/OBS MODERATE 35: CPT | Mod: GC

## 2025-01-08 RX ORDER — IBUPROFEN 200 MG
400 TABLET ORAL ONCE
Refills: 0 | Status: COMPLETED | OUTPATIENT
Start: 2025-01-08 | End: 2025-01-08

## 2025-01-08 RX ORDER — ACETAMINOPHEN 80 MG/.8ML
650 SOLUTION/ DROPS ORAL EVERY 6 HOURS
Refills: 0 | Status: DISCONTINUED | OUTPATIENT
Start: 2025-01-08 | End: 2025-01-14

## 2025-01-08 RX ADMIN — ACETAMINOPHEN 650 MILLIGRAM(S): 80 SOLUTION/ DROPS ORAL at 17:13

## 2025-01-08 RX ADMIN — Medication 400 MILLIGRAM(S): at 22:28

## 2025-01-08 RX ADMIN — Medication 400 MILLIGRAM(S): at 21:25

## 2025-01-08 RX ADMIN — ACETAMINOPHEN 650 MILLIGRAM(S): 80 SOLUTION/ DROPS ORAL at 23:19

## 2025-01-08 NOTE — BH INPATIENT PSYCHIATRY PROGRESS NOTE - CURRENT MEDICATION
MEDICATIONS  (STANDING):  risperiDONE   Tablet 1 milliGRAM(s) Oral at bedtime    MEDICATIONS  (PRN):  haloperidol     Tablet 5 milliGRAM(s) Oral every 6 hours PRN Agitation 2/2 psychosis  haloperidol    Injectable 5 milliGRAM(s) IntraMuscular once PRN Agitation 2/2 psychosis  melatonin. 3 milliGRAM(s) Oral at bedtime PRN Insomnia   MEDICATIONS  (STANDING):  risperiDONE   Tablet 1 milliGRAM(s) Oral at bedtime    MEDICATIONS  (PRN):  acetaminophen     Tablet .. 650 milliGRAM(s) Oral every 6 hours PRN Mild Pain (1 - 3)  haloperidol     Tablet 5 milliGRAM(s) Oral every 6 hours PRN Agitation 2/2 psychosis  haloperidol    Injectable 5 milliGRAM(s) IntraMuscular once PRN Agitation 2/2 psychosis  melatonin. 3 milliGRAM(s) Oral at bedtime PRN Insomnia

## 2025-01-08 NOTE — BH INPATIENT PSYCHIATRY PROGRESS NOTE - NSBHFUPINTERVALHXFT_PSY_A_CORE
Pt evaluated this AM. Seen walking around the unit with winter coat on. Pt irritable during interview stating that she is leaving today. States that she will not be talking to us about anything else. When attempted to talk with her about her reported headaches, pt reports "that's none of your business, that's between me and the MD". When we explained that we are MDs, she became irritable and reiterated that she will not be speaking with us. Pt abruptly terminated interview by leaving the interview room. Medicine recommended CT or MRI, pt agreed to do an MRI this afternoon but then refused to fill out MRI checklist with writer. States "im not telling you anything, they will do it at [facility]", also refusing to be placed in a gown for transfer.    Per staff, pt has been irritable, alluding to elopement and demanding to leave, spoke to RN disrespectfully and told her that "the  will come get you" also threatened to alert "the ministry". 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 You need to know the exact reason I came in. I do not need to be here. You know that every time you change my room you were supposed to take me outside?” Pt stated “It is against the law to move me from to room to room. Go read the mental health law book. You move me again and I’ll have your license taken away." Pt evaluated this AM. Seen walking around the unit with winter coat on. Pt irritable during interview stating that she is leaving today. States that she will not be talking to us about anything else. When attempted to talk with her about her reported headaches, pt reports "that's none of your business, that's between me and the MD". When we explained that we are MDs, she became irritable and reiterated that she will not be speaking with us. Also states she spoke to her "real psychiatrist" earlier today. Pt abruptly terminated interview by leaving the interview room. Medicine recommended CT or MRI, pt agreed to do an MRI this afternoon but then refused to fill out MRI checklist with writer. States "im not telling you anything, they will do it at [facility]", also refusing to be placed in a gown for transfer.    Per staff, pt has been irritable, alluding to elopement and demanding to leave, spoke to RN disrespectfully and told her that "the  will come get you" also threatened to alert "the ministry". 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 You need to know the exact reason I came in. I do not need to be here. You know that every time you change my room you were supposed to take me outside?” Pt stated “It is against the law to move me from to room to room. Go read the mental health law book. You move me again and I’ll have your license taken away."

## 2025-01-08 NOTE — CHART NOTE - NSCHARTNOTEFT_GEN_A_CORE
Saw patient as Tylenol was ordered overnight for her   Explained concern for medication overuse headache and recommended patient abstain from using Tylenol/Motrin over the next few days and monitor of sx improved   Patient then demanded to be discharged as she said she does not need to be hospitalized and does not need to take any psych medications. Patient became very paranoid, asking for writer's credentials and making accusatory statements   Patient demanded "whole body MRI". Discussed recommendation is only for MRI brain which patient agreed to.   Recommend scheduling MRI brain if patient continues to be agreeable
Notified by RN that pt with c/o of ear pain. Pt seen at bedside in NAD. Pt reports a headache and leg pain. Pt further complained of no heat in her room all day, wanting to go to the ED and then stated "I just want to go home".  Pt denies ear pain, tinnitus, CP, vision changes, SOB, N/V, dizziness, lightheadedness, difficulty ambulating. Pt refused to be assessed stating "I want to go home you all are holding me here against my will". Pain medication offered, pt refused. Per RN engineering turned on heat in room. Discussed with RN.  Will continue to follow, RN to notify of any changes.

## 2025-01-08 NOTE — BH INPATIENT PSYCHIATRY PROGRESS NOTE - NSBHCHARTREVIEWVS_PSY_A_CORE FT
Vital Signs Last 24 Hrs  T(C): 37.1 (01-08-25 @ 08:41), Max: 37.1 (01-08-25 @ 08:41)  T(F): 98.7 (01-08-25 @ 08:41), Max: 98.7 (01-08-25 @ 08:41)  HR: --  BP: --  BP(mean): --  RR: 18 (01-08-25 @ 08:41) (18 - 18)  SpO2: --    Orthostatic VS  01-08-25 @ 08:41  Lying BP: --/-- HR: --  Sitting BP: 97/67 HR: 78  Standing BP: 98/85 HR: 84  Site: --  Mode: --  Orthostatic VS  01-07-25 @ 08:50  Lying BP: --/-- HR: --  Sitting BP: 110/76 HR: 68  Standing BP: 115/88 HR: 79  Site: --  Mode: --

## 2025-01-08 NOTE — BH INPATIENT PSYCHIATRY PROGRESS NOTE - NSBHMETABOLIC_PSY_ALL_CORE_FT
BMI: BMI (kg/m2): 34.6 (12-29-24 @ 13:58)  HbA1c: A1C with Estimated Average Glucose Result: 5.1 % (12-31-24 @ 09:15)    Glucose:   BP: --Vital Signs Last 24 Hrs  T(C): 37.1 (01-08-25 @ 08:41), Max: 37.1 (01-08-25 @ 08:41)  T(F): 98.7 (01-08-25 @ 08:41), Max: 98.7 (01-08-25 @ 08:41)  HR: --  BP: --  BP(mean): --  RR: 18 (01-08-25 @ 08:41) (18 - 18)  SpO2: --    Orthostatic VS  01-08-25 @ 08:41  Lying BP: --/-- HR: --  Sitting BP: 97/67 HR: 78  Standing BP: 98/85 HR: 84  Site: --  Mode: --  Orthostatic VS  01-07-25 @ 08:50  Lying BP: --/-- HR: --  Sitting BP: 110/76 HR: 68  Standing BP: 115/88 HR: 79  Site: --  Mode: --    Lipid Panel: Date/Time: 12-31-24 @ 09:15  Cholesterol, Serum: 191  LDL Cholesterol Calculated: 100  HDL Cholesterol, Serum: 81  Total Cholesterol/HDL Ration Measurement: --  Triglycerides, Serum: 53

## 2025-01-08 NOTE — BH INPATIENT PSYCHIATRY PROGRESS NOTE - MSE UNSTRUCTURED FT
Appearance: grooming-intact, wearing surgical bonnet, full coat and hospital gown on the unit   Behavior: minimally cooperative, at some point refusing to speak with treatment team and abruptly terminating interviews, guarded, suspicious of treatment team, no PMA/PMR  Speech: regular rate, rhythm and volume mostly, could be rapid and loud at times when angry  Mood: "Fine"  Affect: increasingly irritable as conversation progressed   Thought process: coherent, though could be difficult to follow at times with illogical thought process, unable to understand her legal status despite multiple explanations  Thought content: +persecutory delusions elicited on interview (believes member of treatment team saw her in the ED and admitted her here, hacker in her and family's phone, upstairs neighbors targeting her, left work due to belief that people were whispering about her, believes neighbors are taking pictures of her and cars are following her), also corroborated by mother's collateral  Perceptions: pt describes hearing noises and sounds that appear related to persecutory content, unclear if these are perceptual disturbances  Insight: poor   Judgement: poor   Cognition: grossly intact   Gait: intact Appearance: grooming-intact, wearing surgical bonnet, full coat and hospital gown on the unit   Behavior: minimally cooperative, eventually refusing to speak with treatment team and abruptly terminating interviews, guarded, suspicious of treatment team  Speech: regular rate, rhythm and volume mostly, could be rapid and loud at times when angry  Mood: "Fine"  Affect: increasingly irritable as conversation progressed   Thought process: difficult to follow at times with illogical thought process and bizarre statements, unable to understand her legal status despite multiple explanations  Thought content: +persecutory delusions elicited on interview (believes member of treatment team saw her in the ED and admitted her here, hacker in her and family's phone, upstairs neighbors targeting her, left work due to belief that people were whispering about her, believes neighbors are taking pictures of her and cars are following her), increasingly paranoid towards treatment team and staff  Perceptions: pt describes hearing noises and sounds that appear related to persecutory content, unclear if these are perceptual disturbances  Insight: poor   Judgement: poor   Cognition: grossly intact   Gait: intact

## 2025-01-08 NOTE — BH CHART NOTE - NSEVENTNOTEFT_PSY_ALL_CORE
DIRECTOR OF INPATIENT PSYCHIATRY NOTE:  I have evaluated the patient’s need for medication over her objection in the presence of the LS  Mr. David Saunders, the risks and benefits of medication, and her ability to make a reasoned decision.      Briefly, the pt is a 45 yo F, currently domiciled with family (parents, 16 year old son), former mental health worker, PMH of obesity s/p gastric bypass in 2011 (on phentermine), PPHx of new onset psychosis with one related prior psychiatric hospitalization ([facility] in 1/2024), who was BIB police after trying to take her 17 yo son out of the home and to a shelter.    On evaluation, the patient was cooperative.  She stated that she did not have bipolar disorder and did not need to take Risperdal.  She explained that she wanted to be on her own and wanted to live out of her other's house, therefore, decided to go to a shelter. Denies having had suicidal/homicidal ideation, denies hallucinatory experiences and denies paranoia. She has been described as irritable and suspicious on the unit.  She has been prescribed Risperdal.        She has not been taking medications as prescribed. She does not understand the extent of her illness.    It is my opinion that this patient currently experiences psychotic symptoms that are severely impacting her safety and ability to care for herself, and would likely benefit from antipsychotic medications.  Furthermore, it is my opinion that she lacks capacity to refuse antipsychotic therapy.  I have informed the patient of my decision and that unless she withdraws her objection to taking medications, we will make application to court for authorization to treat her over her objection. I have notified the patient and [facility] of this decision by letter.

## 2025-01-08 NOTE — BH INPATIENT PSYCHIATRY PROGRESS NOTE - PRN MEDS
MEDICATIONS  (PRN):  haloperidol     Tablet 5 milliGRAM(s) Oral every 6 hours PRN Agitation 2/2 psychosis  haloperidol    Injectable 5 milliGRAM(s) IntraMuscular once PRN Agitation 2/2 psychosis  melatonin. 3 milliGRAM(s) Oral at bedtime PRN Insomnia   MEDICATIONS  (PRN):  acetaminophen     Tablet .. 650 milliGRAM(s) Oral every 6 hours PRN Mild Pain (1 - 3)  haloperidol     Tablet 5 milliGRAM(s) Oral every 6 hours PRN Agitation 2/2 psychosis  haloperidol    Injectable 5 milliGRAM(s) IntraMuscular once PRN Agitation 2/2 psychosis  melatonin. 3 milliGRAM(s) Oral at bedtime PRN Insomnia

## 2025-01-08 NOTE — BH INPATIENT PSYCHIATRY PROGRESS NOTE - NSBHASSESSSUMMFT_PSY_ALL_CORE
Patient is a 45 yo F, currently domiciled with family (parents, 16 year old son), former mental health worker, [employer] of obesity s/p gastric bypass in 2011 (on phentermine), PPHx of new onset psychosis with one related prior psychiatric hospitalization ([facility] in 1/2024), who was BIB police after trying to take her 17 yo son out of the home and to a shelter. This is a patient with little formal psychiatric history, besides one psychiatric hospitalization a year ago for paranoia and alcohol intoxication at [facility] (where pt declined antipsychotics and was discharged on a 3DL, though did endorse multiple paranoid delusions). She has not followed up with care or taken medications since then. Per collateral obtained from mother, patient had been at high functioning baseline prior to one+ year ago, when she became acutely paranoid, with related suspicious behaviors/statements (unplugging devices, believing she is being followed etc), culminating in agitation and desire to remove son from home to take to a shelter, leading to this ED presentation. Of note, pt began menopause about two years ago which is also around the time of onset of her symptoms. Working diagnosis is a late-onset schizophrenia-spectrum illness (given wide ranging paranoid delusions, possible AH, collateral of talking to self, parallel timing to menopause) vs delusional disorder (given lack of prominent other positive symptoms). Will continue Risperdal, continue to monitor, though pt refusing.    On evaluation today, pt continues to be guarded with regard to her suspicious thoughts (related to former co-workers, phone hackers, upstairs neighbors, identity thieves) and is now refusing to speak on the matter. She has also become increasingly more irritable with staff. She still has little insight into her condition and is unable to express a logical line of reasoning that would explain her concerns. Filed for TOO as there is concern from both family and treatment team about patient's ability to function safely at home. Per collateral from mother, she believes patient would benefit from TOO as well and is on board with proceeding with court.   Consulted medicine for headache, attempted to speak with patient regarding head imaging. Pt refused to engage in conversation with treatment team upon original interview but later agreed to an MRI. Pt unwilling to take necessary steps to allow for safe pt transfer to receive MRI (gown, MRI checklist, escort). Perseverating on making sure she will be taken to [facility] in her street clothes. Given this and her alluding to elopement multiple times, there is concern that patient may try to elope when transferred for MRI.     Plan:  1. Legal: Admitted on ML3 continue 9.39 status --> converted to 9.27 1/6, TOO hearing 1/8  2. Safety: No reported SI/SIB/HI/VI currently on unit; continue routine observation.  -Haldol/Ativan PRN medications for safety/agitation  3. Psychiatric:  -continues to refuse Risperdal 1mg PO QHS; R/B/A and side effects discussed  	-Metabolic labs reviewed  4. Therapy: group & milieu therapy  5. Medical: H/o gastric bypass. Previously on phentermine for weight loss, discontinued in the ED . Will keep medication discontinued to due known potential side effect of paranoia and psychosis. Medicine consulted for headache, appreciate recs   -FEP labs (RPR- neg, Vit E66--wpv, folate--wnl, ABAD-neg, ceruloplasmin-wnl, ESR--wnl)  6. Collateral: Collateral from Mother, Marleni Jones [phone]   7. 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 in 1/2024), who was BIB police after trying to take her 15 yo son out of the home and to a shelter. This is a patient with little formal psychiatric history, besides one psychiatric hospitalization a year ago for paranoia and alcohol intoxication at [facility] (where pt declined antipsychotics and was discharged on a 3DL, though did endorse multiple paranoid delusions). She has not followed up with care or taken medications since then. Per collateral obtained from mother, patient had been at high functioning baseline prior to one+ year ago, when she became acutely paranoid, with related suspicious behaviors/statements (unplugging devices, believing she is being followed etc), culminating in agitation and desire to remove son from home to take to a shelter, leading to this ED presentation. Of note, pt began menopause about two years ago which is also around the time of onset of her symptoms. Working diagnosis is a late-onset schizophrenia-spectrum illness (given wide ranging paranoid delusions, possible AH, collateral of talking to self, parallel timing to menopause) vs delusional disorder (given lack of prominent other positive symptoms). Will continue Risperdal, continue to monitor, though pt refusing.    On evaluation today, pt continues to be guarded with regard to her suspicious thoughts (related to former co-workers, phone hackers, upstairs neighbors, identity thieves) and is now refusing to speak on the matter. She has also become increasingly more irritable with staff. She still has little insight into her condition and is unable to express a logical line of reasoning that would explain her concerns. Filed for TOO as there is concern from both family and treatment team about patient's ability to function safely at home.    Consulted medicine for headache, attempted to speak with patient regarding head imaging. Pt refused to engage in conversation with treatment team upon original interview but later agreed to an MRI. Pt unwilling to take necessary steps to allow for safe pt transfer to receive MRI (gown, MRI checklist, escort). Perseverating on making sure she will be taken to [facility] in her street clothes. Given this and her alluding to elopement multiple times, there is concern that patient may try to elope when transferred for MRI.     Plan:  1. Legal: Admitted on ML3 continue 9.39 status --> converted to 9.27 1/6, TOO hearing 1/8  2. Safety: No reported SI/SIB/HI/VI currently on unit; continue routine observation.  -Haldol/Ativan PRN medications for safety/agitation  3. Psychiatric:  -continues to refuse Risperdal 1mg PO QHS; R/B/A and side effects discussed  	-Metabolic labs reviewed  4. Therapy: group & milieu therapy  5. Medical: H/o gastric bypass. Previously on phentermine for weight loss, discontinued in the ED . Will keep medication discontinued to due known potential side effect of paranoia and psychosis. Medicine consulted for headache, appreciate recs   -FEP labs (RPR- neg, Vit C61--lvl, folate--wnl, ABAD-neg, ceruloplasmin-wnl, ESR--wnl)  -Headaches: when more stable, would benefit from head imaging and/or neuro consult, but unwilling to comply with head imaging protocol at this time  6. Collateral: Collateral from Mother, Marleni Jones [phone]   7. Disposition: When stable/pending clinical improvement

## 2025-01-09 PROCEDURE — 99232 SBSQ HOSP IP/OBS MODERATE 35: CPT | Mod: GC

## 2025-01-09 RX ADMIN — ACETAMINOPHEN 650 MILLIGRAM(S): 80 SOLUTION/ DROPS ORAL at 09:03

## 2025-01-09 RX ADMIN — ACETAMINOPHEN 650 MILLIGRAM(S): 80 SOLUTION/ DROPS ORAL at 17:43

## 2025-01-09 RX ADMIN — ACETAMINOPHEN 650 MILLIGRAM(S): 80 SOLUTION/ DROPS ORAL at 21:27

## 2025-01-09 RX ADMIN — ACETAMINOPHEN 650 MILLIGRAM(S): 80 SOLUTION/ DROPS ORAL at 09:55

## 2025-01-09 RX ADMIN — ACETAMINOPHEN 650 MILLIGRAM(S): 80 SOLUTION/ DROPS ORAL at 02:59

## 2025-01-09 NOTE — BH INPATIENT PSYCHIATRY PROGRESS NOTE - CURRENT MEDICATION
MEDICATIONS  (STANDING):  risperiDONE   Tablet 1 milliGRAM(s) Oral at bedtime    MEDICATIONS  (PRN):  acetaminophen     Tablet .. 650 milliGRAM(s) Oral every 6 hours PRN Mild Pain (1 - 3)  haloperidol     Tablet 5 milliGRAM(s) Oral every 6 hours PRN Agitation 2/2 psychosis  haloperidol    Injectable 5 milliGRAM(s) IntraMuscular once PRN Agitation 2/2 psychosis  melatonin. 3 milliGRAM(s) Oral at bedtime PRN Insomnia

## 2025-01-09 NOTE — BH TREATMENT PLAN - NSBHPRIMARYDX_PSY_ALL_CORE
Schizophrenia spectrum disorder with psychotic disorder type not yet determined    
Psychosis    
Psychosis

## 2025-01-09 NOTE — BH INPATIENT PSYCHIATRY PROGRESS NOTE - PRN MEDS
MEDICATIONS  (PRN):  acetaminophen     Tablet .. 650 milliGRAM(s) Oral every 6 hours PRN Mild Pain (1 - 3)  haloperidol     Tablet 5 milliGRAM(s) Oral every 6 hours PRN Agitation 2/2 psychosis  haloperidol    Injectable 5 milliGRAM(s) IntraMuscular once PRN Agitation 2/2 psychosis  melatonin. 3 milliGRAM(s) Oral at bedtime PRN Insomnia

## 2025-01-09 NOTE — BH INPATIENT PSYCHIATRY PROGRESS NOTE - NSBHCHARTREVIEWVS_PSY_A_CORE FT
Vital Signs Last 24 Hrs  T(C): 36.7 (01-09-25 @ 08:38), Max: 36.7 (01-09-25 @ 08:38)  T(F): 98.1 (01-09-25 @ 08:38), Max: 98.1 (01-09-25 @ 08:38)  HR: --  BP: --  BP(mean): --  RR: --  SpO2: --    Orthostatic VS  01-09-25 @ 08:38  Lying BP: --/-- HR: --  Sitting BP: 101/68 HR: 74  Standing BP: 104/79 HR: 77  Site: --  Mode: --  Orthostatic VS  01-08-25 @ 08:41  Lying BP: --/-- HR: --  Sitting BP: 97/67 HR: 78  Standing BP: 98/85 HR: 84  Site: --  Mode: --

## 2025-01-09 NOTE — BH TREATMENT PLAN - NSTXPATIENTPARTICIPATE_PSY_ALL_CORE
Patient participated in identification of needs/problems/goals for treatment
Patient participated in identification of needs/problems/goals for treatment/Patient participated in defining interventions
No, patient unwilling to participate

## 2025-01-09 NOTE — BH INPATIENT PSYCHIATRY PROGRESS NOTE - MSE UNSTRUCTURED FT
Appearance: grooming-intact, wearing surgical bonnet, full coat and hospital gown on the unit   Behavior: minimally cooperative, eventually refusing to speak with treatment team and abruptly terminating interviews, guarded, suspicious of treatment team  Speech: regular rate, rhythm and volume mostly, could be rapid and loud at times when angry  Mood: "Fine"  Affect: irritable   Thought process: difficult to follow at times with illogical thought process and bizarre statements, unable to understand her legal status despite multiple explanations  Thought content: +persecutory delusions elicited on interview (believes member of treatment team saw her in the ED and admitted her here, hacker in her and family's phone, upstairs neighbors targeting her, left work due to belief that people were whispering about her, believes neighbors are taking pictures of her and cars are following her), increasingly paranoid towards treatment team and staff  Perceptions: pt describes hearing noises and sounds that appear related to persecutory content, unclear if these are perceptual disturbances  Insight: poor   Judgement: poor   Cognition: grossly intact   Gait: intact X Size Of Lesion In Cm (Optional): 0 Detail Level: Simple Appearance: grooming-intact, wearing surgical bonnet, full coat and hospital gown on the unit   Behavior: minimally cooperative, eventually refusing to speak with treatment team and abruptly terminating interviews, guarded, suspicious of treatment team  Speech: regular rate, rhythm and volume mostly, could be rapid and loud at times when angry  Mood: "Fine"  Affect: irritable   Thought process: difficult to follow at times with illogical thought process and bizarre statements, unable to understand her legal status despite multiple explanations  Thought content: +persecutory delusions elicited on interview (believes member of treatment team saw her in the ED and admitted her here, hacker in her and family's phone, upstairs neighbors targeting her, left work due to belief that people were whispering about her, believes neighbors are taking pictures of her and cars are following her), increasingly paranoid towards treatment team and staff, asking for room change for no apparent reason  Perceptions: pt describes hearing sounds at home that appear related to persecutory content, unclear if these are perceptual disturbances  Insight: poor   Judgement: poor   Cognition: grossly intact   Gait: intact

## 2025-01-09 NOTE — BH TREATMENT PLAN - NSTXCOPEINTERPR_PSY_ALL_CORE
Patient will attend daily symptom management groups and meet with Psychiatric Rehabilitation Staff individually in order to identify and utilize 2 coping skills that meet their needs for better symptom management by day 7.
Patient will attend daily symptom management groups and meet with Psychiatric Rehabilitation Staff individually in order to identify and utilize 2 coping skills that meet their needs for better symptom management by day 7.
The writer met with patient in order to review progress toward psychiatric rehabilitation goal over the past week and assess current functioning. Patient was received in the dining room and was receptive to meeting on approach.  Patient reported that she is doing good today and that she is “perfectly fine.” No progress has been made over the past seven days towards the patient’s psychiatric rehabilitation goal of identifying and utilizing two coping skills that meet their needs over the past seven days. The patient has attended some of the psychiatric rehabilitation group programs over the past seven days. The patient is slightly active on the unit, but has been observed to interact with few peers. Psychiatric Rehabilitation staff will continue to engage patient daily in order to develop rapport, provide support and to assist patient in demonstrating progress towards Psychiatric Rehabilitation goals over the next week.

## 2025-01-09 NOTE — BH TREATMENT PLAN - NSTXDCSOCINTERSW_PSY_ALL_CORE
Ongoing support, psychoed and enocuragment provided in efort to comply with meds, tx and discharge plans.
Ongoing support,psychoed and encouragement provided in effort to comply with meds,tx and discharge plans.

## 2025-01-09 NOTE — BH TREATMENT PLAN - NSTXPSYCHOGOAL_PSY_ALL_CORE
Make at least 5 reality based statements/requests to staff and/or peers
Make at least 5 reality based statements/requests to staff and/or peers
Will identify 2 coping skills that assist with focus on reality

## 2025-01-09 NOTE — BH INPATIENT PSYCHIATRY PROGRESS NOTE - NSBHMETABOLIC_PSY_ALL_CORE_FT
BMI: BMI (kg/m2): 34.6 (12-29-24 @ 13:58)  HbA1c: A1C with Estimated Average Glucose Result: 5.1 % (12-31-24 @ 09:15)    Glucose:   BP: --Vital Signs Last 24 Hrs  T(C): 36.7 (01-09-25 @ 08:38), Max: 36.7 (01-09-25 @ 08:38)  T(F): 98.1 (01-09-25 @ 08:38), Max: 98.1 (01-09-25 @ 08:38)  HR: --  BP: --  BP(mean): --  RR: --  SpO2: --    Orthostatic VS  01-09-25 @ 08:38  Lying BP: --/-- HR: --  Sitting BP: 101/68 HR: 74  Standing BP: 104/79 HR: 77  Site: --  Mode: --  Orthostatic VS  01-08-25 @ 08:41  Lying BP: --/-- HR: --  Sitting BP: 97/67 HR: 78  Standing BP: 98/85 HR: 84  Site: --  Mode: --    Lipid Panel: Date/Time: 12-31-24 @ 09:15  Cholesterol, Serum: 191  LDL Cholesterol Calculated: 100  HDL Cholesterol, Serum: 81  Total Cholesterol/HDL Ration Measurement: --  Triglycerides, Serum: 53

## 2025-01-09 NOTE — BH TREATMENT PLAN - NSTXPSYCHOINTERMD_PSY_ALL_CORE
Med management with risperidone
Med management with risperidone
Jerzy, attempting med management with risperidone

## 2025-01-09 NOTE — BH INPATIENT PSYCHIATRY PROGRESS NOTE - NSBHASSESSSUMMFT_PSY_ALL_CORE
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 This is a patient with little formal psychiatric history, besides one psychiatric hospitalization a year ago for paranoia and alcohol intoxication at [facility] (where pt declined antipsychotics and was discharged on a 3DL, though did endorse multiple paranoid delusions). She has not followed up with care or taken medications since then. Per collateral obtained from mother, patient had been at high functioning baseline prior to one+ year ago, when she became acutely paranoid, with related suspicious behaviors/statements (unplugging devices, believing she is being followed etc), culminating in agitation and desire to remove son from home to take to a shelter, leading to this ED presentation. Of note, pt began menopause about two years ago which is also around the time of onset of her symptoms. Working diagnosis is a late-onset schizophrenia-spectrum illness (given wide ranging paranoid delusions, possible AH, collateral of talking to self, parallel timing to menopause) vs delusional disorder (given lack of prominent other positive symptoms). Will continue Risperdal, continue to monitor, though pt refusing.    On evaluation today, pt continues to be very irritable and guarded with regard to her suspicious thoughts (related to former co-workers, phone hackers, upstairs neighbors, identity thieves) and is now refusing to speak on this matter or anything regarding her treatment. She has also become increasingly more irritable with staff and has been making bizarre statements and threats. She still has little insight into her condition and is unable to express a logical line of reasoning that would explain her concerns. Filed for TOO as there is concern from both family and treatment team about patient's ability to function safely at home. Proceeding with trial next week.        Plan:  1. Legal: Admitted on ML3 continue 9.39 status --> converted to 9.27 1/6, TOO hearing 1/8  2. Safety: No reported SI/SIB/HI/VI currently on unit; continue routine observation.  -Haldol/Ativan PRN medications for safety/agitation  3. Psychiatric:  -continues to refuse Risperdal 1mg PO QHS; R/B/A and side effects discussed  	-Metabolic labs reviewed  4. Therapy: group & milieu therapy  5. Medical: H/o gastric bypass. Previously on phentermine for weight loss, discontinued in the ED . Will keep medication discontinued to due known potential side effect of paranoia and psychosis. Medicine consulted for headache, appreciate recs   -FEP labs (RPR- neg, Vit Y77--nej, folate--wnl, ABAD-neg, ceruloplasmin-wnl, ESR--wnl)  -Headaches: when more stable, would benefit from head imaging and/or neuro consult, but unwilling to comply with head imaging protocol at this time  6. Collateral: Collateral from Mother, Marleni Jones [phone]   7. Disposition: When stable/pending clinical improvement   Patient is a 43 yo F, currently domiciled with family (parents, 16 year old son), former mental health worker, [employer] of obesity s/p gastric bypass in 2011 (on phentermine), PPHx of new onset psychosis with one related prior psychiatric hospitalization ([facility] in 1/2024), who was BIB police after trying to take her 17 yo son out of the home and to a shelter.     This is a patient with little formal psychiatric history, besides one psychiatric hospitalization a year ago for paranoia and alcohol intoxication at [facility] (where pt declined antipsychotics and was discharged on a 3DL, though did endorse multiple paranoid delusions). She has not followed up with care or taken medications since then. Per collateral obtained from mother, patient had been at high functioning baseline prior to one+ year ago, when she became acutely paranoid, with related suspicious behaviors/statements (unplugging devices, believing she is being followed etc), culminating in agitation and desire to remove son from home to take to a shelter, leading to this ED presentation. Of note, pt began menopause about two years ago which is also around the time of onset of her symptoms. Working diagnosis is a late-onset schizophrenia-spectrum illness (given wide ranging paranoid delusions, possible AH, collateral of talking to self, parallel timing to menopause) vs delusional disorder (given lack of prominent other positive symptoms).     On evaluation today, pt continues to be very irritable and guarded with regard to her suspicious thoughts (related to former co-workers, phone hackers, upstairs neighbors, identity thieves) and is now refusing to speak on this matter or anything regarding her treatment. She has also become increasingly more irritable with staff and has been making bizarre statements and threats. She still has little insight into her condition and is unable to express a logical line of reasoning that would explain her concerns. Will continue Risperdal, though pt refusing. Filed for TOO as there is concern from both family and treatment team about patient's ability to function safely at home. Court likely 1/14.        Plan:  1. Legal: Admitted on ML3 continue 9.39 status --> converted to 9.27 1/6, TOO hearing 1/8, court likely 1/14  2. Safety: No reported SI/SIB/HI/VI currently on unit; continue routine observation.  -Haldol/Ativan PRN medications for safety/agitation  3. Psychiatric:  -continues to refuse Risperdal 1mg PO QHS; R/B/A and side effects discussed  	-Metabolic labs reviewed  4. Therapy: group & milieu therapy  5. Medical: H/o gastric bypass. Previously on phentermine for weight loss, discontinued in the ED . Will keep medication discontinued to due known potential side effect of paranoia and psychosis. Medicine consulted for headache, appreciate recs   -FEP labs (RPR- neg, Vit A88--gvo, folate--wnl, ABAD-neg, ceruloplasmin-wnl, ESR--wnl)  -Headaches: when more stable, would benefit from head imaging and/or neuro consult, but unwilling to comply with head imaging protocol at this time  6. Collateral: Collateral from Mother, Marleni Jones [phone]   7. Disposition: When stable/pending clinical improvement

## 2025-01-09 NOTE — BH INPATIENT PSYCHIATRY PROGRESS NOTE - NSBHFUPINTERVALHXFT_PSY_A_CORE
Pt evaluated this AM. Seen walking around the unit with winter coat on. Pt irritable during interview stating that she is leaving today. States that she will not be talking to us about anything else. Attempted to talk with patient about some of her commentary to the nursing staff, pt states "get to the point!". Attempted to continue conversation about her behavior, pt abruptly terminated interview, told writer to "get a life" and left the table. Pt slept in dayroom overnight, does not appear to be sleeping well. Pt evaluated this AM. Seen walking around the unit with winter coat on. Pt irritable during interview stating that she is leaving today. States that she will not be talking to us about anything else. Attempted to talk with patient about some of her commentary to the nursing staff, pt states "get to the point!". Attempted to continue conversation about her behavior, pt abruptly terminated interview, told writer to "get a life" and left the table. Pt slept in dayroom overnight, about 2-3 hours, and appeared tired this morning.

## 2025-01-10 PROCEDURE — 99232 SBSQ HOSP IP/OBS MODERATE 35: CPT | Mod: GC

## 2025-01-10 RX ADMIN — ACETAMINOPHEN 650 MILLIGRAM(S): 80 SOLUTION/ DROPS ORAL at 19:43

## 2025-01-10 RX ADMIN — ACETAMINOPHEN 650 MILLIGRAM(S): 80 SOLUTION/ DROPS ORAL at 18:37

## 2025-01-10 RX ADMIN — ACETAMINOPHEN 650 MILLIGRAM(S): 80 SOLUTION/ DROPS ORAL at 10:30

## 2025-01-10 NOTE — BH INPATIENT PSYCHIATRY PROGRESS NOTE - NSBHCHARTREVIEWVS_PSY_A_CORE FT
Vital Signs Last 24 Hrs  T(C): 37 (01-10-25 @ 07:25), Max: 37 (01-10-25 @ 07:25)  T(F): 98.6 (01-10-25 @ 07:25), Max: 98.6 (01-10-25 @ 07:25)  HR: --  BP: --  BP(mean): --  RR: --  SpO2: --    Orthostatic VS  01-10-25 @ 07:25  Lying BP: --/-- HR: --  Sitting BP: 101/80 HR: 79  Standing BP: 96/77 HR: 93  Site: --  Mode: electronic  Orthostatic VS  01-09-25 @ 08:38  Lying BP: --/-- HR: --  Sitting BP: 101/68 HR: 74  Standing BP: 104/79 HR: 77  Site: --  Mode: --

## 2025-01-10 NOTE — BH INPATIENT PSYCHIATRY PROGRESS NOTE - MSE UNSTRUCTURED FT
Appearance: grooming-intact, wearing surgical bonnet, sweater and hospital gown on the unit   Behavior: minimally cooperative, eventually refusing to speak with treatment team and abruptly terminating interviews, guarded, suspicious of treatment team  Speech: regular rate, rhythm and volume mostly, could be rapid and loud at times when angry  Mood: "Good"  Affect: irritable   Thought process: difficult to follow at times with illogical thought process and bizarre statements, unable to understand her legal status despite multiple explanations  Thought content: +persecutory delusions elicited on interview (believes member of treatment team saw her in the ED and admitted her here, hacker in her and family's phone, upstairs neighbors targeting her, left work due to belief that people were whispering about her, believes neighbors are taking pictures of her and cars are following her), increasingly paranoid towards treatment team and staff, asking for room change for no apparent reason  Perceptions: pt describes hearing sounds at home that appear related to persecutory content, unclear if these are perceptual disturbances  Insight: poor   Judgement: poor   Cognition: grossly intact   Gait: intact Appearance: grooming-intact, wearing surgical bonnet, sweater and hospital gown on the unit   Behavior: minimally cooperative, eventually refusing to speak with treatment team and abruptly terminating interviews, guarded, suspicious of treatment team  Speech: regular rate, rhythm and volume mostly, could be rapid and loud at times when angry  Mood: "Good"  Affect: irritable, incongruent to stated mood  Thought process: difficult to follow at times with illogical thought process and bizarre statements, unable to understand her legal status despite multiple explanations  Thought content: +persecutory delusions elicited on interview (believes member of treatment team saw her in the ED and admitted her here, hacker in her and family's phone, upstairs neighbors targeting her, left work due to belief that people were whispering about her, believes neighbors are taking pictures of her and cars are following her), increasingly paranoid towards treatment team and staff (believes we are trying to admit her to [facility])  Perceptions: pt describes hearing sounds at home that appear related to persecutory content, unclear if these are perceptual disturbances  Insight: poor   Judgement: poor   Cognition: grossly intact   Gait: intact

## 2025-01-10 NOTE — BH INPATIENT PSYCHIATRY PROGRESS NOTE - NSBHMETABOLIC_PSY_ALL_CORE_FT
BMI: BMI (kg/m2): 34.6 (12-29-24 @ 13:58)  HbA1c: A1C with Estimated Average Glucose Result: 5.1 % (12-31-24 @ 09:15)    Glucose:   BP: --Vital Signs Last 24 Hrs  T(C): 37 (01-10-25 @ 07:25), Max: 37 (01-10-25 @ 07:25)  T(F): 98.6 (01-10-25 @ 07:25), Max: 98.6 (01-10-25 @ 07:25)  HR: --  BP: --  BP(mean): --  RR: --  SpO2: --    Orthostatic VS  01-10-25 @ 07:25  Lying BP: --/-- HR: --  Sitting BP: 101/80 HR: 79  Standing BP: 96/77 HR: 93  Site: --  Mode: electronic  Orthostatic VS  01-09-25 @ 08:38  Lying BP: --/-- HR: --  Sitting BP: 101/68 HR: 74  Standing BP: 104/79 HR: 77  Site: --  Mode: --    Lipid Panel: Date/Time: 12-31-24 @ 09:15  Cholesterol, Serum: 191  LDL Cholesterol Calculated: 100  HDL Cholesterol, Serum: 81  Total Cholesterol/HDL Ration Measurement: --  Triglycerides, Serum: 53

## 2025-01-10 NOTE — BH INPATIENT PSYCHIATRY PROGRESS NOTE - NSBHFUPINTERVALHXFT_PSY_A_CORE
Noted.     Pt evaluated this AM. Seen walking around the unit. Pt irritable during interview, with head in hands and eyes closed. Explained legal rights to patient and encouraged her to come to her court hearing. Pt reports that she will not go because she "knows what we are trying to do". Explained that the court is on [place] grounds but we have no intention of transferring her there, if this is a concern. Pt then abruptly stood up, stated "You go to [place], im not going" and terminated the interview.

## 2025-01-10 NOTE — BH INPATIENT PSYCHIATRY PROGRESS NOTE - NSBHASSESSSUMMFT_PSY_ALL_CORE
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     This is a patient with little formal psychiatric history, besides one psychiatric hospitalization a year ago for paranoia and alcohol intoxication at [facility] (where pt declined antipsychotics and was discharged on a 3DL, though did endorse multiple paranoid delusions). She has not followed up with care or taken medications since then. Per collateral obtained from mother, patient had been at high functioning baseline prior to one+ year ago, when she became acutely paranoid, with related suspicious behaviors/statements (unplugging devices, believing she is being followed etc), culminating in agitation and desire to remove son from home to take to a shelter, leading to this ED presentation. Of note, pt began menopause about two years ago which is also around the time of onset of her symptoms. Working diagnosis is a late-onset schizophrenia-spectrum illness (given wide ranging paranoid delusions, possible AH, collateral of talking to self, parallel timing to menopause) vs delusional disorder (given lack of prominent other positive symptoms).     On evaluation today, pt continues to be very irritable and guarded with regard to her suspicious thoughts (related to former co-workers, phone hackers, upstairs neighbors, identity thieves) and is now refusing to speak on this matter or anything regarding her treatment. She has also become increasingly more irritable with staff and has been making bizarre statements and threats. Pt is suspicious of any intervention including our attempt to encourage her attendance at court. She still has little insight into her condition and is unable to express a logical line of reasoning that would explain her concerns. Will continue Risperdal, though pt refusing.  Court likely 1/14.        Plan:  1. Legal: Admitted on ML3 continue 9.39 status --> converted to 9.27 1/6, TOO hearing 1/8, court likely 1/14  2. Safety: No reported SI/SIB/HI/VI currently on unit; continue routine observation.  -Haldol/Ativan PRN medications for safety/agitation  3. Psychiatric:  -continues to refuse Risperdal 1mg PO QHS; R/B/A and side effects discussed  	-Metabolic labs reviewed  4. Therapy: group & milieu therapy  5. Medical: H/o gastric bypass. Previously on phentermine for weight loss, discontinued in the ED . Will keep medication discontinued to due known potential side effect of paranoia and psychosis. Medicine consulted for headache, appreciate recs   -FEP labs (RPR- neg, Vit Z93--xoh, folate--wnl, ABAD-neg, ceruloplasmin-wnl, ESR--wnl)  -Headaches: when more stable, would benefit from head imaging and/or neuro consult, but unwilling to comply with head imaging protocol at this time  6. Collateral: Collateral from Mother, Marleni Jones [phone]   7. Disposition: When stable/pending clinical improvement

## 2025-01-11 RX ADMIN — ACETAMINOPHEN 650 MILLIGRAM(S): 80 SOLUTION/ DROPS ORAL at 21:28

## 2025-01-11 RX ADMIN — ACETAMINOPHEN 650 MILLIGRAM(S): 80 SOLUTION/ DROPS ORAL at 18:26

## 2025-01-11 RX ADMIN — Medication 400 MILLIGRAM(S): at 21:28

## 2025-01-11 RX ADMIN — ACETAMINOPHEN 650 MILLIGRAM(S): 80 SOLUTION/ DROPS ORAL at 00:40

## 2025-01-11 RX ADMIN — ACETAMINOPHEN 650 MILLIGRAM(S): 80 SOLUTION/ DROPS ORAL at 21:27

## 2025-01-11 RX ADMIN — ACETAMINOPHEN 650 MILLIGRAM(S): 80 SOLUTION/ DROPS ORAL at 06:22

## 2025-01-11 RX ADMIN — ACETAMINOPHEN 650 MILLIGRAM(S): 80 SOLUTION/ DROPS ORAL at 20:41

## 2025-01-12 RX ADMIN — ACETAMINOPHEN 650 MILLIGRAM(S): 80 SOLUTION/ DROPS ORAL at 13:00

## 2025-01-12 RX ADMIN — ACETAMINOPHEN 650 MILLIGRAM(S): 80 SOLUTION/ DROPS ORAL at 22:02

## 2025-01-12 RX ADMIN — ACETAMINOPHEN 650 MILLIGRAM(S): 80 SOLUTION/ DROPS ORAL at 14:46

## 2025-01-12 RX ADMIN — ACETAMINOPHEN 650 MILLIGRAM(S): 80 SOLUTION/ DROPS ORAL at 19:21

## 2025-01-13 PROCEDURE — 99232 SBSQ HOSP IP/OBS MODERATE 35: CPT | Mod: GC

## 2025-01-13 RX ORDER — LIDOCAINE 50 MG/G
1 OINTMENT TOPICAL ONCE
Refills: 0 | Status: COMPLETED | OUTPATIENT
Start: 2025-01-13 | End: 2025-01-13

## 2025-01-13 RX ADMIN — ACETAMINOPHEN 650 MILLIGRAM(S): 80 SOLUTION/ DROPS ORAL at 17:53

## 2025-01-13 RX ADMIN — ACETAMINOPHEN 650 MILLIGRAM(S): 80 SOLUTION/ DROPS ORAL at 21:00

## 2025-01-13 RX ADMIN — ACETAMINOPHEN 650 MILLIGRAM(S): 80 SOLUTION/ DROPS ORAL at 15:49

## 2025-01-13 RX ADMIN — LIDOCAINE 1 PATCH: 50 OINTMENT TOPICAL at 00:42

## 2025-01-13 NOTE — BH INPATIENT PSYCHIATRY PROGRESS NOTE - NSBHFUPINTERVALHXFT_PSY_A_CORE
Chart reviewed. Case discussed with interdisciplinary team. Patient seen and examined. No acute events overnight. Declined standing antipsychotic medication. No PRNs required. VSS.     On assessment, patient is irritable and minimally engaged in interview. Reports that her weekend went well and that she gets along w/patients and staff however "these people are not my friends." Continues to report that she does not have a psychiatric illness and does not need to be hospitalized. Reports that she plans to go to court tomorrow and that it will be up to the  to decide her disposition before terminating the interview.

## 2025-01-13 NOTE — BH INPATIENT PSYCHIATRY PROGRESS NOTE - NSTXDCSOCDATETRGT_PSY_ALL_CORE
15-Jake-2025
14-Jan-2025
15-Jake-2025
14-Jan-2025
14-Jan-2025
15-Jake-2025
14-Jan-2025
14-Jan-2025
15-Jake-2025
14-Jan-2025

## 2025-01-13 NOTE — BH INPATIENT PSYCHIATRY PROGRESS NOTE - NSTXCOPEDATETRGT_PSY_ALL_CORE
12-Jan-2025
06-Jan-2025
12-Jan-2025
19-Jan-2025
06-Jan-2025
09-Jan-2025
12-Jan-2025
06-Jan-2025

## 2025-01-13 NOTE — BH INPATIENT PSYCHIATRY PROGRESS NOTE - NSTXTOBACODATEEST_PSY_ALL_CORE
02-Jan-2025
31-Dec-2024
31-Dec-2024

## 2025-01-13 NOTE — BH INPATIENT PSYCHIATRY PROGRESS NOTE - NSTXPSYCHOINTERMD_PSY_ALL_CORE
Med management with risperidone
Detail Level: Detailed
Comment: Infection noted today. Plan to resolve infection with antibiotics then schedule an excision.
Med management with risperidone
Jerzy, attempting med management with risperidone
Jerzy, attempting med management with risperidone
Med management with risperidone
2 = A lot of assistance

## 2025-01-13 NOTE — BH INPATIENT PSYCHIATRY PROGRESS NOTE - NSBHATTESTBILLING_PSY_A_CORE
16676-Tmihglnesx OBS or IP - moderate complexity OR 35-49 mins
25565-Llhhkhjycj OBS or IP - moderate complexity OR 35-49 mins
08422-Kmmhtxedxo OBS or IP - high complexity OR 50-79 mins
21472-Inisncnmxu OBS or IP - moderate complexity OR 35-49 mins
45964-Omicfmwcqz OBS or IP - moderate complexity OR 35-49 mins
64942-Ysitgwawon OBS or IP - moderate complexity OR 35-49 mins
[id number]-Nshxhwtdni OBS or IP - moderate complexity OR 35-49 mins
99243-Kwsrmffqsm OBS or IP - moderate complexity OR 35-49 mins
27867-Bxxymapqzb OBS or IP - low complexity OR 25-34 mins
03422-Dqjcyjhrfe OBS or IP - moderate complexity OR 35-49 mins

## 2025-01-13 NOTE — BH INPATIENT PSYCHIATRY PROGRESS NOTE - NSBHASSESSSUMMFT_PSY_ALL_CORE
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 in 1/2024), who was BIB police after trying to take her 17 yo son out of the home and to a shelter.     This is a patient with little formal psychiatric history, besides one psychiatric hospitalization a year ago for paranoia and alcohol intoxication at [facility] (where pt declined antipsychotics and was discharged on a 3DL, though did endorse multiple paranoid delusions). She has not followed up with care or taken medications since then. Per collateral obtained from mother, patient had been at high functioning baseline prior to one+ year ago, when she became acutely paranoid, with related suspicious behaviors/statements (unplugging devices, believing she is being followed etc), culminating in agitation and desire to remove son from home to take to a shelter, leading to this ED presentation. Of note, pt began menopause about two years ago which is also around the time of onset of her symptoms. Working diagnosis is a late-onset schizophrenia-spectrum illness (given wide ranging paranoid delusions, possible AH, collateral of talking to self, parallel timing to menopause) vs delusional disorder (given lack of prominent other positive symptoms).     On evaluation today, pt continues to be very irritable and guarded with regard to her suspicious thoughts and is now refusing to speak on this matter or anything regarding her treatment. Pt continues to be suspicious of any intervention however is now amenable to attending court tomorrow 1/14. She still has little insight into her condition and is unable to express a logical line of reasoning that would explain her concerns. Will continue Risperdal, though pt refusing.      Plan:  1. Legal: Admitted on ML3 continue 9.39 status --> converted to 9.27 1/6, TOO hearing 1/8, court likely 1/14  2. Safety: No reported SI/SIB/HI/VI currently on unit; continue routine observation.  -Haldol/Ativan PRN medications for safety/agitation  3. Psychiatric:  -continues to refuse Risperdal 1mg PO QHS; R/B/A and side effects discussed  	-Metabolic labs reviewed  4. Therapy: group & milieu therapy  5. Medical: H/o gastric bypass. Previously on phentermine for weight loss, discontinued in the ED . Will keep medication discontinued to due known potential side effect of paranoia and psychosis.   -FEP labs (RPR- neg, Vit F54--ssn, folate--wnl, ABAD-neg, ceruloplasmin-wnl, ESR--wnl)  -Headaches: when more stable, would benefit from head imaging and/or neuro consult, but unwilling to comply with head imaging protocol at this time  6. Collateral: Collateral from Mother, Marleni Jones [phone]   7. 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     This is a patient with little formal psychiatric history, besides one psychiatric hospitalization a year ago for paranoia and alcohol intoxication at [facility] (where pt declined antipsychotics and was discharged on a 3DL, though did endorse multiple paranoid delusions). She has not followed up with care or taken medications since then. Per collateral obtained from mother, patient had been at high functioning baseline prior to one+ year ago, when she became acutely paranoid, with related suspicious behaviors/statements (unplugging devices, believing she is being followed etc), culminating in agitation and desire to remove son from home to take to a shelter, leading to this ED presentation. Of note, pt began menopause about two years ago which is also around the time of onset of her symptoms. Working diagnosis is a late-onset schizophrenia-spectrum illness (given wide ranging paranoid delusions, possible AH, collateral of talking to self, parallel timing to menopause) vs delusional disorder (given lack of prominent other positive symptoms).     On evaluation today, pt continues to be very irritable and guarded with regard to her suspicious thoughts. Pt continues to be suspicious of any intervention including Risperdal, however is now amenable to attending court tomorrow 1/14. She still has little insight into her condition and is unable to express a logical line of reasoning that would explain her concerns.     Plan:  1. Legal: Admitted on ML3 continue 9.39 status --> converted to 9.27 1/6, TOO hearing 1/8, court likely 1/14  2. Safety: No reported SI/SIB/HI/VI currently on unit; continue routine observation.  -Haldol/Ativan PRN medications for safety/agitation  3. Psychiatric:  -continues to refuse Risperdal 1mg PO QHS; R/B/A and side effects discussed  	-Metabolic labs reviewed  4. Therapy: group & milieu therapy  5. Medical: H/o gastric bypass. Previously on phentermine for weight loss, discontinued in the ED . Will keep medication discontinued to due known potential side effect of paranoia and psychosis.   -FEP labs (RPR- neg, Vit A16--rxc, folate--wnl, ABAD-neg, ceruloplasmin-wnl, ESR--wnl)  -Headaches: when more stable, would benefit from head imaging and/or neuro consult, but unwilling to comply with head imaging protocol at this time  6. Collateral: Collateral from Mother, Marleni Jones [phone]   7. Disposition: When stable/pending clinical improvement

## 2025-01-13 NOTE — BH INPATIENT PSYCHIATRY PROGRESS NOTE - NSICDXBHTERTIARYDX_PSY_ALL_CORE
R/O Delusional disorder   F22  R/O Phentermine adverse reaction   T50.5X5A  R/O Late onset schizophrenia   F20.89  

## 2025-01-13 NOTE — BH INPATIENT PSYCHIATRY PROGRESS NOTE - NSTXCOPEDATEEST_PSY_ALL_CORE
30-Dec-2024
02-Jan-2025
30-Dec-2024

## 2025-01-13 NOTE — BH INPATIENT PSYCHIATRY PROGRESS NOTE - NSTXDCSOCGOAL_PSY_ALL_CORE
Will accept referrals to support groups, clubhouse, senior centers, etc.

## 2025-01-13 NOTE — BH INPATIENT PSYCHIATRY PROGRESS NOTE - NSTXPSYCHODATEEST_PSY_ALL_CORE
02-Jan-2025
31-Dec-2024
02-Jan-2025
31-Dec-2024
02-Jan-2025

## 2025-01-13 NOTE — BH INPATIENT PSYCHIATRY PROGRESS NOTE - NSTXTOBACODATETRGT_PSY_ALL_CORE
09-Jan-2025
07-Jan-2025
09-Jan-2025
07-Jan-2025

## 2025-01-13 NOTE — BH INPATIENT PSYCHIATRY PROGRESS NOTE - NSBHMETABOLIC_PSY_ALL_CORE_FT
BMI: BMI (kg/m2): 34.6 (12-29-24 @ 13:58)  HbA1c: A1C with Estimated Average Glucose Result: 5.1 % (12-31-24 @ 09:15)    Glucose:   BP: --Vital Signs Last 24 Hrs  T(C): --  T(F): --  HR: --  BP: --  BP(mean): --  RR: --  SpO2: --    Orthostatic VS  01-13-25 @ 08:47  Lying BP: --/-- HR: --  Sitting BP: 111/76 HR: 74  Standing BP: 99/71 HR: 77  Site: --  Mode: --  Orthostatic VS  01-12-25 @ 08:14  Lying BP: --/-- HR: --  Sitting BP: 111/84 HR: 81  Standing BP: 107/73 HR: 78  Site: --  Mode: --    Lipid Panel: Date/Time: 12-31-24 @ 09:15  Cholesterol, Serum: 191  LDL Cholesterol Calculated: 100  HDL Cholesterol, Serum: 81  Total Cholesterol/HDL Ration Measurement: --  Triglycerides, Serum: 53

## 2025-01-13 NOTE — BH INPATIENT PSYCHIATRY PROGRESS NOTE - MSE UNSTRUCTURED FT
Appearance: grooming-intact, wearing surgical bonnet, sweater and hospital gown on the unit   Behavior: minimally cooperative, guarded, suspicious of treatment team  Speech: regular rate, rhythm and volume mostly, could be rapid and loud at times when angry  Mood: "fine"  Affect: irritable, incongruent to stated mood  Thought process: difficult to follow at times with illogical thought process and bizarre statements, unable to understand her legal status despite multiple explanations  Thought content: no SI, HI elicited today. In the past, +persecutory delusions elicited on interview (believes member of treatment team saw her in the ED and admitted her here, hacker in her and family's phone, upstairs neighbors targeting her, left work due to belief that people were whispering about her, believes neighbors are taking pictures of her and cars are following her), increasingly paranoid towards treatment team and staff (believes we are trying to admit her to [facility])  Perceptions: no perceptual disturbances elicited on interview today. in the past, pt describes hearing sounds at home that appear related to persecutory content, unclear if these are perceptual disturbances  Insight: poor   Judgement: poor   Cognition: grossly intact   Gait: intact Appearance: grooming-intact, wearing surgical bonnet  Behavior: minimally cooperative, guarded, suspicious of treatment team  Speech: regular rate, rhythm and volume mostly, could be rapid and loud at times when angry  Mood: "fine"  Affect: irritable, incongruent to stated mood  Thought process: difficult to follow at times with illogical thought process and bizarre statements  Thought content: no SI, HI elicited today. In the past, +persecutory delusions elicited on interview (believes member of treatment team saw her in the ED and admitted her here, hacker in her and family's phone, upstairs neighbors targeting her, left work due to belief that people were whispering about her, believes neighbors are taking pictures of her and cars are following her), increasingly paranoid towards treatment team and staff (believes we are trying to admit her to [place])  Perceptions: no perceptual disturbances elicited on interview today. in the past, pt describes hearing sounds at home that appear related to persecutory content, unclear if these are perceptual disturbances  Insight: poor   Judgement: poor   Cognition: grossly intact   Gait: intact

## 2025-01-13 NOTE — BH INPATIENT PSYCHIATRY PROGRESS NOTE - NSTXPSYCHOGOAL_PSY_ALL_CORE
Will identify 2 coping skills that assist with focus on reality
Make at least 5 reality based statements/requests to staff and/or peers
Will identify 2 coping skills that assist with focus on reality
Make at least 5 reality based statements/requests to staff and/or peers
Will identify 2 coping skills that assist with focus on reality
Make at least 5 reality based statements/requests to staff and/or peers

## 2025-01-13 NOTE — BH INPATIENT PSYCHIATRY PROGRESS NOTE - NSTXPROBTOBACO_PSY_ALL_CORE
TOBACCO/NICOTINE USE

## 2025-01-13 NOTE — BH INPATIENT PSYCHIATRY PROGRESS NOTE - NSDCCRITERIA_PSY_ALL_CORE
pending clinical improvement 

## 2025-01-13 NOTE — BH INPATIENT PSYCHIATRY PROGRESS NOTE - NSTXTOBACOGOAL_PSY_ALL_CORE
Will identify 3 coping strategies to reduce nicotine use
Will accept nicotine replacement therapy
Will accept nicotine replacement therapy

## 2025-01-13 NOTE — BH INPATIENT PSYCHIATRY PROGRESS NOTE - NSBHATTESTCOMMENTATTENDFT_PSY_A_CORE
Patient seen and evaluated with Dr. Mcclain. She is guarded and minimizing around circumstances leading to this admission, and declines to speak about events leading to past admission. No sydney delusions elicited on unit, but has a paranoid stance and illogical thought process in attempting to discuss concerns from family. Refusing treatment, will try to have family meeting to discuss with mother. Given significance of concerns from collateral and pt's care of minor at home, need to further monitor and clarify symptoms.
Pt seen and evaluated with Dr. Mcclain. She is increasingly irritable, paranoid and disorganized with treatment team and staff. Today, told this attending that she would not be going to court next week, and suggested that this attending would lose her license if she went to court. Irritably requesting room change for no clear reason. Slept 2-3 hours in day room yesterday.
Patient seen and evaluated with Dr. Mcclain. She has become increasingly irritable, guarded, hostile and argumentative with staff and treatment team, and thought process becoming increasingly difficult to follow. TOO application sent, hearing tomorrow.
Pt seen and evaluated with Dr. Mcclain. Patient continues to be guarded, irritable and paranoid. Despite our efforts to explain her legal status, she continues to doubt the information provided to her. She is now stating she does not want to court, as she seems paranoid that we are trying to admit her to [facility]. Encouraging her to attend.
Patient seen and evaluated with Dr. Mcclain. Patient increasingly paranoid and disorganized with both treatment team and staff members. She asked to speak about her headaches, but then refused, stating she wanted to speak to her MD and not her psychiatrist, and then later that we are not her psychiatrists, stating the “state” is her psychiatrist and terminating meeting. Seen by internist today, who also described patient as being very paranoid, but ultimately agreeable to head imaging. With treatment team, she refuses MRI checklist or to wear  hospital gown, and makes allusions to eloping. Will defer MRI at this time until more stable.
Patient seen and evaluated with Dr. Andrade. Remains irritable, guarded, suspicious with team. States that we don't actually think she needs treatement, though are pushing it none the less. TOO and d/c hearing tomorrow.
Patient seen and evaluated with Dr. Mcclain. She remains irritable, guarded, argumentative, illogical and paranoid, with wide ranging persecutory delusions. She was particularly upset and irritable during family meeting, and could not tolerate hearing from treatment team or mother around concerns, or potential options moving forward (i.e. trialing medications here and working together on discharge planning). She continues to refute basic information about her admission, such as her legal status and treatment team members, despite frequent education.
Patient seen and evaluated with Dr. Mcclain. Irritable and guarded with team, unwilling to speak on almost any subject matter. Despite repeated and extensive conversations around legal status and plan, she continues to state that she can leave on a 72 hour later, and puts on her coat and gathers her belongings, stating she is leaving today.
Patient seen and evaluated with Dr. Mcclain. No behavioral agitation, though last night did not sleep and was pacing in the dayroom per staff. Pt disclosed significantly more of her experiences today, which appear to be c/w wide ranging persecutory delusions, which have effected her life (pulling son out of school, trying to take him to shelter etc). Continues to refuse meds. Meeting to discuss with mother tomorrow.

## 2025-01-13 NOTE — BH INPATIENT PSYCHIATRY PROGRESS NOTE - NSBHCHARTREVIEWVS_PSY_A_CORE FT
Vital Signs Last 24 Hrs  T(C): --  T(F): --  HR: --  BP: --  BP(mean): --  RR: --  SpO2: --    Orthostatic VS  01-13-25 @ 08:47  Lying BP: --/-- HR: --  Sitting BP: 111/76 HR: 74  Standing BP: 99/71 HR: 77  Site: --  Mode: --  Orthostatic VS  01-12-25 @ 08:14  Lying BP: --/-- HR: --  Sitting BP: 111/84 HR: 81  Standing BP: 107/73 HR: 78  Site: --  Mode: --

## 2025-01-13 NOTE — BH INPATIENT PSYCHIATRY PROGRESS NOTE - NS ED BHA REVIEW OF ED CHART AVAILABLE INVESTIGATIONS REVIEWED
The Delivery OB Provider certifies that vaginal examination and/or abdominal examination after the delivery was done and no foreign body was found. None available

## 2025-01-13 NOTE — BH INPATIENT PSYCHIATRY PROGRESS NOTE - NSBHATTESTTYPEVISIT_PSY_A_CORE
Attending Only
Attending with Resident/Fellow/Student

## 2025-01-13 NOTE — BH INPATIENT PSYCHIATRY PROGRESS NOTE - NSTXMEDICINTERMD_PSY_ALL_CORE
Jerzy, attempting med management with risperidone

## 2025-01-13 NOTE — BH INPATIENT PSYCHIATRY PROGRESS NOTE - NSTXDCSOCDATEEST_PSY_ALL_CORE
08-Jan-2025
31-Dec-2024
08-Jan-2025
31-Dec-2024
08-Jan-2025
31-Dec-2024
08-Jan-2025
31-Dec-2024

## 2025-01-13 NOTE — BH INPATIENT PSYCHIATRY PROGRESS NOTE - NSTXPSYCHODATETRGT_PSY_ALL_CORE
09-Jan-2025
07-Jan-2025
07-Jan-2025

## 2025-01-13 NOTE — BH INPATIENT PSYCHIATRY PROGRESS NOTE - NSTXPROBDCSOC_PSY_ALL_CORE
DISCHARGE ISSUE - POOR SOCIALIZATION IN COMMUNITY

## 2025-01-14 VITALS — TEMPERATURE: 98 F

## 2025-01-14 PROCEDURE — 99239 HOSP IP/OBS DSCHRG MGMT >30: CPT

## 2025-01-14 RX ORDER — LORAZEPAM 1 MG/1
2 TABLET ORAL EVERY 6 HOURS
Refills: 0 | Status: DISCONTINUED | OUTPATIENT
Start: 2025-01-14 | End: 2025-01-14

## 2025-01-14 RX ORDER — LORAZEPAM 1 MG/1
2 TABLET ORAL ONCE
Refills: 0 | Status: DISCONTINUED | OUTPATIENT
Start: 2025-01-14 | End: 2025-01-14

## 2025-01-14 RX ADMIN — ACETAMINOPHEN 650 MILLIGRAM(S): 80 SOLUTION/ DROPS ORAL at 12:27

## 2025-01-14 NOTE — BH INPATIENT PSYCHIATRY DISCHARGE NOTE - HOSPITAL COURSE
Patient was brought to the ER by EMS after she called 911 to settle a dispute with her mother, and was noted to be paranoid. The patient had abruptly planned to leave her family home, where she lives with her parents and 16 year old son, and move her and her son in to a shelter, prompting an argument with her mother and the subsequent 911 call.     Per chart review and collateral from family, patient had been functioning well and with no psychiatric illness when in 2023 she began becoming paranoid (ex. feeling as though she was being followed and spied on, breaking items at home she believed stored devices, leaving her job as she felt co-workers for targeting her). She was also drinking alcohol heavily at the time, and had been physically and verbally aggressive with family. She was hospitalized at [facility] voluntarily in January 2024, refused all medications, and was ultimately discharged on a 3-day letter.     Prior to the current admission, patient stated she felt as though her license plate and wallet had been stolen, that her phone had been hacked and passwords changed, that other members of the family had their phones hacked as well, and that the upstairs neighbors were creating noise excessively when only she was home, and knew where she was in the house. She stated that this was the reason for wanting to leave and go to a shelter with her son. Per family, patient was sleeping very little at home, and keeping people up at night, also noted to be talking to herself. While on the unit, patient was irritable, hostile and paranoid towards staff. For example, talked about sending the " squad" on a nurse after the nurse asked her to change room, and having her license taken away by the "[employer]." She was advised to get an MRI brain by the medical doctor for headaches, but refused to complete the pre-MRI checklist with treatment team, as she did not want to give any information about herself. She asked for the phone number to her son's high schools, so that she could call them and they could help her get released from the hospital.    Patient was initiated on risperidone, but consistently refused, despite extensive psychoeducation and discussions around concerns from treatment team and family, including during a family meeting to discuss her treatment. Patient requested court ordered discharge, and team applied for treatment over objection. Court case on discharge and TOO was held on 1/14/25. Patient was granted court ordered discharge.    Patient was in good behavioral control on the unit, and did not require IM medications or restraints/seclusion. Patient was able to attend to ADLs independently, and was appropriate with staff and peers. She did not express SI or HI at any point over admission.    Medically, patient remained stable and did not require medical consultation.     Risk Assessment at discharge:  Patient is at CHRONIC elevated risk of harm to self and others due to history of psychosis and history of treatment non-adherence. ACUTE risk factors on admission include increasing paranoia, unable to work outside the home, lack of psychiatric or medications. PROTECTIVE factors that mitigate this risk on discharge include maintenance of behavioral control on unit, lack of SI/HI. Patient is agreeable to call 911, or go to the nearest ED with any imminent safety concerns. Given the above, patient no longer appears to be at acutely increased risk of harm to self and others above chronic elevated risk and is appropriate for discharge.      Psychotropic medications on discharge:  None     Patient was brought to the ER by EMS after she called 911 to settle a dispute with her mother, and was noted to be paranoid. The patient had abruptly planned to leave her family home, where she lives with her parents and 16 year old son, and move her and her son in to a shelter, prompting an argument with her mother and the subsequent 911 call.     Per chart review and collateral from family, patient had been functioning well and with no psychiatric illness when in 2023 she began becoming paranoid (ex. feeling as though she was being followed and spied on, breaking items at home she believed stored devices, leaving her job as she felt co-workers for targeting her). She was also drinking alcohol heavily at the time, and had been physically and verbally aggressive with family. She was hospitalized at [facility] voluntarily in January 2024, refused all medications, and was ultimately discharged on a 3-day letter.     Prior to the current admission, patient stated she felt as though her license plate and wallet had been stolen, that her phone had been hacked and passwords changed, that other members of the family had their phones hacked as well, and that the upstairs neighbors were creating noise excessively when only she was home, and knew where she was in the house. She stated that this was the reason for wanting to leave and go to a shelter with her son. Per family, patient was sleeping very little at home, and keeping people up at night, also noted to be talking to herself. While on the unit, patient was irritable, hostile and paranoid towards staff. For example, talked about sending the " squad" on a nurse after the nurse asked her to change room, and having her license taken away by the "[employer]." She was advised to get an MRI brain by the medical doctor for headaches, but refused to complete the pre-MRI checklist with treatment team, as she did not want to give any information about herself. She asked for the phone number to her son's high school, so that she could call them and they could help her get released from the hospital.    Patient was initiated on risperidone, but consistently refused, despite extensive psychoeducation and discussions around concerns from treatment team and family, including during a family meeting to discuss her treatment. Patient requested court ordered discharge, and team applied for treatment over objection. Court case on discharge and TOO was held on 1/14/25. Patient was granted court ordered discharge. Patient was discharged that day. She is not on any medications, but was provided with a list of mental health resources and encouraged to follow-up. She requested to have a taxi bring her to the [facility] by [facility].     Patient was in good behavioral control on the unit, and did not require IM medications or restraints/seclusion. Patient was able to attend to ADLs independently, and was appropriate with staff and peers. She did not express SI or HI at any point over admission.    Medically, patient remained stable and did not require medical consultation.     Risk Assessment at discharge:  Patient is at CHRONIC elevated risk of harm to self and others due to history of psychosis and history of treatment non-adherence. ACUTE risk factors on admission include increasing paranoia, unable to work outside the home, lack of psychiatric follow-up or medications. PROTECTIVE factors that mitigate this risk on discharge include maintenance of behavioral control on unit, lack of SI/HI. Patient is agreeable to call 911, or go to the nearest ED with any imminent safety concerns. Given the above, patient no longer appears to be at acutely increased risk of harm to self and others above chronic elevated risk and is appropriate for discharge.      Psychotropic medications on discharge:  None

## 2025-01-14 NOTE — BH DISCHARGE NOTE NURSING/SOCIAL WORK/PSYCH REHAB - PATIENT PORTAL LINK FT
You can access the FollowMyHealth Patient Portal offered by [facility] by registering at the following website: [email or web address]. By joining [facility]’s FollowMyHealth portal, you will also be able to view your health information using other applications (apps) compatible with our system.

## 2025-01-14 NOTE — BH DISCHARGE NOTE NURSING/SOCIAL WORK/PSYCH REHAB - NSDCPRRECOMMEND_PSY_ALL_CORE
Upon discharge, it is recommended that patient utilize outpatient treatment to sustain noted improvement and to continue utilizing their healthy coping skills and to expand their inventory of healthy coping skills.

## 2025-01-14 NOTE — BH INPATIENT PSYCHIATRY DISCHARGE NOTE - ATTENDING DISCHARGE PHYSICAL EXAMINATION:
Patient seen and evaluated with Dr. Andrade. On day of discharge, patient remained irritable, guarded and paranoid towards team. No SI, HI. Patient continues to desire discharge. Court-order discharge granted. Appearance: grooming-intact, wearing surgical bonnet  Behavior: minimally cooperative, guarded, suspicious of treatment team  Speech: regular rate, rhythm and volume mostly, could be rapid and loud at times when angry  Mood: "fine"  Affect: irritable  Thought process: difficult to follow at times with illogical thought process and bizarre statements  Thought content: no SI, HI elicited today. In the past, +persecutory delusions elicited on interview (believes member of treatment team saw her in the ED and admitted her here, hacker in her and family's phone, upstairs neighbors targeting her, left work due to belief that people were whispering about her, believes neighbors are taking pictures of her and cars are following her), paranoia toward tx team  Perceptions: no perceptual disturbances elicited on interview today. in the past, pt describes hearing sounds at home that appear related to persecutory content, unclear if these are perceptual disturbances  Insight: poor   Judgement: poor   Cognition: grossly intact   Gait: intact

## 2025-01-14 NOTE — BH INPATIENT PSYCHIATRY DISCHARGE NOTE - NSBHASSESSSUMMFT_PSY_ALL_CORE
Patient is a 45 yo F, currently domiciled with family (parents, 16 year old son), former mental health worker, PM of obesity s/p gastric bypass in 2011 (on phentermine), PPHx of new onset psychosis with one related prior psychiatric hospitalization ([facility] in 1/2024), who was BIB police after trying to take her 17 yo son out of the home and to a shelter.     This is a patient with little formal psychiatric history, besides one psychiatric hospitalization a year ago for paranoia and alcohol intoxication at [facility] (where pt declined antipsychotics and was discharged on a 3DL, though did endorse multiple paranoid delusions). She has not followed up with care or taken medications since then. Per collateral obtained from mother, patient had been at high functioning baseline prior to one+ year ago, when she became acutely paranoid, with related suspicious behaviors/statements (unplugging devices, believing she is being followed etc), culminating in agitation and desire to remove son from home to take to a shelter, leading to this ED presentation. Of note, pt began menopause about two years ago which is also around the time of onset of her symptoms. Working diagnosis is a late-onset schizophrenia-spectrum illness (given wide ranging paranoid delusions, possible AH, collateral of talking to self, parallel timing to menopause) vs delusional disorder (given lack of prominent other positive symptoms).     On evaluation today, pt continues to be very irritable and guarded with regard to her suspicious thoughts. Pt continues to be suspicious of any intervention including Risperdal, however is now amenable to attending court tomorrow 1/14. She still has little insight into her condition and is unable to express a logical line of reasoning that would explain her concerns.     Plan:  1. Legal: Admitted on ML3 continue 9.39 status --> converted to 9.27 1/6, TOO hearing 1/8, court likely 1/14  2. Safety: No reported SI/SIB/HI/VI currently on unit; continue routine observation.  -Haldol/Ativan PRN medications for safety/agitation  3. Psychiatric:  -continues to refuse Risperdal 1mg PO QHS; R/B/A and side effects discussed  	-Metabolic labs reviewed  4. Therapy: group & milieu therapy  5. Medical: H/o gastric bypass. Previously on phentermine for weight loss, discontinued in the ED . Will keep medication discontinued to due known potential side effect of paranoia and psychosis.   -FEP labs (RPR- neg, Vit N89--yov, folate--wnl, ABAD-neg, ceruloplasmin-wnl, ESR--wnl)  -Headaches: when more stable, would benefit from head imaging and/or neuro consult, but unwilling to comply with head imaging protocol at this time  6. Collateral: Collateral from Mother, Marleni Jones [phone]   7. Disposition: When stable/pending clinical improvement

## 2025-01-14 NOTE — BH DISCHARGE NOTE NURSING/SOCIAL WORK/PSYCH REHAB - NSCDUDCCRISIS_PSY_A_CORE
[facility]  1 [phone]) [facility]LL ([phone])  Text "WELL" to 09569  Website: www.Zoom/.[facility] 1 (925) 171-HOPE (0347) Website: www.safehorizon.org/.  [facility], Adults and Families  [address] [place] [address]  Mobile Crisis Hotline – (395) 887-7633/.National Suicide Prevention Lifeline 2 (194) 913-1149/.  [facility]  1 (522) LIFENET (767-3123)/.  [facility]  (902) 751-1082/.  [facility]  [address] [place] [address] [phone]   Hours:  Monday through Friday from 9 AM to 3 PM/988 Suicide and Crisis Lifeline

## 2025-01-14 NOTE — BH INPATIENT PSYCHIATRY DISCHARGE NOTE - NSBHFUPINTERVALHXFT_PSY_A_CORE
VSS, no acute events, slept until 4am. Patient seen in hallway. She continues to be irritable with team. She is asking to bring her ID with her to court, and makes some unclear statements about the IDs she needs now in order to travel. Denies SI, HI. Court today for TOO and court order discharge.  granted discharge.    Attempted to call mother to discuss court result and discharge. On multiple attempts, no answer, and no VM. Patient asked to be discharged to the [place] by [facility].

## 2025-01-14 NOTE — BH INPATIENT PSYCHIATRY DISCHARGE NOTE - NSDCCPCAREPLAN_GEN_ALL_CORE_FT
PRINCIPAL DISCHARGE DIAGNOSIS  Diagnosis: Delusional disorder  Assessment and Plan of Treatment:

## 2025-01-14 NOTE — BH DISCHARGE NOTE NURSING/SOCIAL WORK/PSYCH REHAB - DISCHARGE INSTRUCTIONS AFTERCARE APPOINTMENTS
In order to check the location, date, or time of your aftercare appointment, please refer to your Discharge Instructions Document given to you upon leaving the hospital.  If you have lost the instructions please call [phone]

## 2025-01-14 NOTE — BH INPATIENT PSYCHIATRY DISCHARGE NOTE - HPI (INCLUDE ILLNESS QUALITY, SEVERITY, DURATION, TIMING, CONTEXT, MODIFYING FACTORS, ASSOCIATED SIGNS AND SYMPTOMS)
Patient is a 45 yo F, currently domiciled with family (parents, 16 year old son), former mental health worker, [facility] of obesity s/p gastric bypass in 2011 (on phentermine), PPHx of new onset psychosis with one related prior psychiatric hospitalization ([facility] in 1/2024), who was BIB police after trying to take her 17 yo son out of the home and to a shelter     Per ED provider note, " Patient  has a history of 1 previous psychiatric hospitalization on 9.27 at [facility] for paranoia, agitated behavior, was held for several weeks but refused antipsychotic and was discharged, did not follow-up. Patient at that time had made 5 police reports, emailed FBI and . Patient has remained home in [place] with mother and 16 year-old son over the past year, spending most of her time at home while son is in school, not working. She has gotten increasingly paranoid, believing that people she is unable to identify are watching her and following her. She believes that they are doing something in the home, and she has heard them making noise in the house, she unplugs the phone, turns off the heat as she believes something will happen. She has refused any treatment for this. According to her mother, yesterday she want to her sister-in-law's house and had an argument, although the details are not clear. She came home and this morning told her mother that she was leaving the house with her 16 year-old son and was going to a "a shelter." Mother refused, as this is clearly not safe plan. Patient called 911 herself as she wanted to leave with her son, was taken to the ED via EMS. Most of the history provided by mother, patient is very guarded, refusing to say anything other than "I want to go home, I can take my son where ever I want, I am not staying in that house." She is irritable and guarded, largely refusing exam. No substance misuse, no legal history, no suicide attempts, no hallucinations, no major mood symptoms."    On evaluation today, pt is calm and cooperative. Pt reports that she is unsure why she has been admitted.  States that she was attempting to leave her mother's house with her son so that they can move into a shelter. Unclear why the patient needed to leave the house so urgently. Pt reports that he mother did not want her to take the son with her to a shelter. Pt then called the police to "settle the dispute". It was difficult at first to follow the patient's story (potential reasons are broad and could be due to language barrier, thought disorder or rate at which patient was telling the story due to excitement) but pt became clearer as the interview went on. 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 Pt reports being overwhelmed with her workload at home and threatened to leave and go to shelter as a way to "pressure" her mother to do more work related to her fathers' care. Pt reports that she has been attempting to move out of her parent's house for months but has been unable due to her workload. Reports that if she had an apartment or somewhere safe to go on the night of the incident she would have gone but does not believe she actually intended to move to a shelter. States that during her first psychiatric hospitalization 1 year ago at [facility], she was there because she was drinking to much, not eating and saying paranoid things. States that this is not the case now as she has not had a drink since then. States that she has not been delusional, paranoid or aggressive since arriving at the ED although it was unclear to the treatment team why she was brought to the ED or why she received IM injections for agitation. When asked about reason for IMs, pt states that the staff told her "everyone gets these medications". Denies symptoms of depression, anxiety or ish. Denies drug use. Believes that this was all a misunderstanding and would like to go home.       Collateral information from pt's mother: states pt was acting "crazy", pacing, cursing at 1am last night and that's what led to the  being called. Police noted her disorganized behavior. Pt believes that someone is doing something to their home, and she has heard them making noise in the house, she unplugs the phone, turns off the heat as she believes something will happen. Spoke about giving away her son at the shelter. This paranoid state has been happening on and off since last year but yesterday was the worst she has been. She will take walks at night and believes someone is following her, thinks that she hears clicking on the phone that indicates that people are listening in on her conversations. Mother states that she was completely fine and high functioning up until last year.

## 2025-01-14 NOTE — BH DISCHARGE NOTE NURSING/SOCIAL WORK/PSYCH REHAB - NSDCPRGOAL_PSY_ALL_CORE
- no overt gi bleed, hgb stable, reports passing brown stool  - cont ppi/carafate  - monitor cbc, transfuse prn  - no gi objection to triple therapy   - monitor stool color closely
Over the course of the current hospitalization, Psychiatric Rehabilitation Staff and patient discussed level of functioning, addressed concerns surrounding the hospitalization, discharge, engaged in skill development and safety planning. Patient met specified goal of identify and utilize two coping skills to meet their needs. Progress was evidenced by patient identifying listening to music, cooking, cleaning, and shopping. Patient completed a safety plan upon discharge. A copy of the safety plan was given upon discharge for reference.

## 2025-01-14 NOTE — BH INPATIENT PSYCHIATRY DISCHARGE NOTE - NSBHMETABOLIC_PSY_ALL_CORE_FT
BMI: BMI (kg/m2): 34.6 (12-29-24 @ 13:58)  HbA1c: A1C with Estimated Average Glucose Result: 5.1 % (12-31-24 @ 09:15)    Glucose:   BP: --Vital Signs Last 24 Hrs  T(C): 36.7 (01-14-25 @ 06:46), Max: 36.7 (01-14-25 @ 06:46)  T(F): 98.1 (01-14-25 @ 06:46), Max: 98.1 (01-14-25 @ 06:46)  HR: --  BP: --  BP(mean): --  RR: --  SpO2: --    Orthostatic VS  01-14-25 @ 06:46  Lying BP: --/-- HR: --  Sitting BP: 106/79 HR: 66  Standing BP: 103/88 HR: 77  Site: --  Mode: --  Orthostatic VS  01-13-25 @ 08:47  Lying BP: --/-- HR: --  Sitting BP: 111/76 HR: 74  Standing BP: 99/71 HR: 77  Site: --  Mode: --    Lipid Panel: Date/Time: 12-31-24 @ 09:15  Cholesterol, Serum: 191  LDL Cholesterol Calculated: 100  HDL Cholesterol, Serum: 81  Total Cholesterol/HDL Ration Measurement: --  Triglycerides, Serum: 53

## 2025-01-14 NOTE — BH INPATIENT PSYCHIATRY DISCHARGE NOTE - OTHER PAST PSYCHIATRIC HISTORY (INCLUDE DETAILS REGARDING ONSET, COURSE OF ILLNESS, INPATIENT/OUTPATIENT TREATMENT)
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 Medical hx significant for obesity s/p gastric bypass 2011, currently taking phentermine prescribed by PCP 37.5 mg fow weight loss. Patient  has a history of 1 previous psychiatric hospitalization on 9.27 at [facility] for paranoia, agitated behavior, was held for several weeks but refused antipsychotic and was discharged, did not follow-up. Patient at that time had made 5 police reports, emailed FBI and . Patient has remained home in [place] with mother and 16 year-old son over the past year, spending most of her time at home while son is in school, not working. She has gotten increasingly paranoid, believing that people she is unable to identify are watching her and following her. She believes that they are doing something in the home, and she has heard them making noise in the house, she unplugs the phone, turns off the heat as she believes something will happen. She has refused any treatment for

## 2025-01-14 NOTE — BH INPATIENT PSYCHIATRY DISCHARGE NOTE - MSE UNSTRUCTURED FT
Appearance: grooming-intact, wearing surgical bonnet  Behavior: minimally cooperative, guarded, suspicious of treatment team  Speech: regular rate, rhythm and volume mostly, could be rapid and loud at times when angry  Mood: "fine"  Affect: irritable  Thought process: difficult to follow at times with illogical thought process and bizarre statements  Thought content: no SI, HI elicited today. In the past, +persecutory delusions elicited on interview (believes member of treatment team saw her in the ED and admitted her here, hacker in her and family's phone, upstairs neighbors targeting her, left work due to belief that people were whispering about her, believes neighbors are taking pictures of her and cars are following her), paranoia toward tx team  Perceptions: no perceptual disturbances elicited on interview today. in the past, pt describes hearing sounds at home that appear related to persecutory content, unclear if these are perceptual disturbances  Insight: poor   Judgement: poor   Cognition: grossly intact   Gait: intact

## 2025-01-16 ENCOUNTER — TRANSCRIPTION ENCOUNTER (OUTPATIENT)
Age: 45
End: 2025-01-16

## 2025-03-06 ENCOUNTER — APPOINTMENT (OUTPATIENT)
Dept: INTERNAL MEDICINE | Facility: CLINIC | Age: 45
End: 2025-03-06

## 2025-03-10 ENCOUNTER — EMERGENCY (EMERGENCY)
Facility: HOSPITAL | Age: 45
LOS: 1 days | Discharge: HOME | End: 2025-03-10
Admitting: EMERGENCY MEDICINE
Payer: SELF-PAY

## 2025-03-10 VITALS
OXYGEN SATURATION: 99 % | TEMPERATURE: 99 F | HEART RATE: 129 BPM | DIASTOLIC BLOOD PRESSURE: 80 MMHG | SYSTOLIC BLOOD PRESSURE: 109 MMHG | RESPIRATION RATE: 20 BRPM

## 2025-03-10 PROCEDURE — 93010 ELECTROCARDIOGRAM REPORT: CPT

## 2025-03-10 PROCEDURE — 99053 MED SERV 10PM-8AM 24 HR FAC: CPT

## 2025-03-10 PROCEDURE — 99285 EMERGENCY DEPT VISIT HI MDM: CPT

## 2025-03-10 NOTE — ED ADULT NURSE NOTE - OBJECTIVE STATEMENT
Pt brought in by ambulance for cutting off the power to her neighbors home, reports neighbors were "disturbing my peace" and causing her to have shoulder pain/HA d/t loud noise upstairs from her. She reports she had verbal argument with neighbor which then resulted in her turning off their electrical supply for their apt. Pt reports she ran from  when they arrived to her home as she did not want to be taken to the hospital. Patient denies current SI, HI, auditory, visual or tactile hallucinations. Patient denies any c/o pain or discomfort at this time, she is a&ox3, calm and cooperative with assessment.

## 2025-03-10 NOTE — ED PROVIDER NOTE - PROGRESS NOTE DETAILS
Maverick MOORE: Pt signed out to me.  She is pending psych reassessment in the morning.  Pt has not slept all night, pacing, asking to leave, repeatedly leaving her room.  Pt states that she has to drive her son to school in the morning and needs to leave.  She reports she has already spoken to the  and she is allowed to go.  PRN IM medications ordered for progressive disorganization and irritability, continued agitation. Maverick MOORE: Pt more calm, did not require PRN meds.  She has been reassessed by psychiatry and cleared for discharge.

## 2025-03-10 NOTE — ED PROVIDER NOTE - OBJECTIVE STATEMENT
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 She is currently prescribed phentermine 37.5mg. She presents following an episode of agitation and a verbal altercation with a renter in her home. The patient reports that the upstairs renter was being too loud, and after her request to lower the volume was refused, a verbal altercation ensued. She then cut off the power to the renter's unit, prompting the renter to call 911. Upon the arrival of EMS and police, the patient barricaded herself and then attempted to flee to her car to avoid hospitalization. Currently, she is calm, cooperative, and follows directions. She reports previous psychiatric hospitalizations and denies suicidal ideation, homicidal ideation, auditory or visual hallucinations, paranoia, or physical complaints.

## 2025-03-10 NOTE — ED ADULT NURSE NOTE - NSFALLUNIVINTERV_ED_ALL_ED
Bed/Stretcher in lowest position, wheels locked, appropriate side rails in place/Call bell, personal items and telephone in reach/Instruct patient to call for assistance before getting out of bed/chair/stretcher/Non-slip footwear applied when patient is off stretcher/[place] to call system/Physically safe environment - no spills, clutter or unnecessary equipment/Purposeful proactive rounding/Room/bathroom lighting operational, light cord in reach

## 2025-03-10 NOTE — ED PROVIDER NOTE - PATIENT PORTAL LINK FT
You can access the FollowMyHealth Patient Portal offered by [facility] by registering at the following website: [email or web address]. By joining [facility]’s FollowMyHealth portal, you will also be able to view your health information using other applications (apps) compatible with our system.

## 2025-03-10 NOTE — ED BEHAVIORAL HEALTH NOTE - BEHAVIORAL HEALTH NOTE
As per request of provider, writer contacted patient’s mother Marleni Jones [phone] for collateral information. She requested a call back in 15 minutes and said she could not talk at this moment. Writer agreed to follow up. As per request of provider, writer contacted patient’s mother Marleni Jones [phone] for collateral information. She requested a call back in 15 minutes and said she could not talk at this moment. Writer agreed to follow up.    The writer spoke with the patient's mother, Marleni Cadena ([phone]), to obtain collateral information. The following information was provided by the patient's mother:    The patient is a 44-year-old female who resides with her mother, father, and 16-year-old son. No safety concerns regarding the son were reported. The patient has a history of mental illness (diagnosis unknown), is unemployed, and was brought in by EMS.    The mother is unsure of the precise events leading to the patient's transport, but the patient's son reported that she turned off the electricity for an upstairs tenant. The tenant complained, and the patient reportedly began cursing at them and locked her door, prompting the tenant to call 911. The patient attempted to leave when police arrived. According to the mother, this behavior has been ongoing for a couple of days, with the patient repeatedly tampering with the breaker and unplugging appliances such as the refrigerator, microwave, and coffee machine. The patient offers various explanations for her actions, recently attributing them to neck pain. Previous admissions were related to persecutory delusions, with the patient reporting people following her and making noises. During a prior episode, she reportedly believed the ice in the refrigerator was people.    It is unknown whether the patient is currently taking medication or compliant with outpatient treatment. No drug or alcohol use is reported. The patient's sleep is poor, characterized by nighttime pacing and talking. Hygiene and appetite remain at baseline. The patient talks to herself and appears to experience auditory hallucinations. She exhibits delusional ideation, speaking nonsensically about her knee and neck. The patient has unplugged the stove, microwave, and freezer, expressing paranoid beliefs about the upstairs tenants. While her behavior is not considered dangerous, she displayed verbal aggression toward the tenants.    The patient spends most of the day at home with her father. The current symptom exacerbation began at the end of February. No medical problems were reported. The family describes her baseline as calm and productive, without paranoia or self-talk, and notes a prior history of employment.    Regarding violence and aggression, the patient has a history of violent behavior, with the last reported incident in December involving physical aggression towards her sister. She does not have access to firearms.    Disposition: The family is advocating for psychiatric admission.

## 2025-03-10 NOTE — ED PROVIDER NOTE - NSFOLLOWUPCLINICS_GEN_ALL_ED_FT
[facility]  Behavioral Health  [address] [place] [address]  Phone: [phone]  Fax:

## 2025-03-10 NOTE — ED PROVIDER NOTE - NSFOLLOWUPINSTRUCTIONS_ED_ALL_ED_FT
Detail Level: Zone
Take your medications as prescribed.  Please follow-up with you PMD and psychiatrist within the next 1 week.  Return to the emergency department for any new or worsening symptoms.

## 2025-03-10 NOTE — ED ADULT NURSE NOTE - CHIEF COMPLAINT QUOTE
Pt is under arrest for cutting off the power to her neighbors home. Pts son then told [employer] that his mother has been acting very paranoid lately and that he doesn't feel safe at home with her. As per son she has been talking to herself a lot, being aggressive (never physical). Pt also ran from . Denies SI, HI, auditory, visual or tactile hallucinations. Pt tachy, denies chest pain (will go to  pending EKG results)

## 2025-03-10 NOTE — ED ADULT TRIAGE NOTE - CHIEF COMPLAINT QUOTE
Pt is under arrest for cutting off the power to her neighbors home. Pts son then told [employer] that his mother has been acting very paranoid lately and that he doesn't feel safe at home with her. As per son she has been talking to herself a lot, being aggressive (never physical) Pt also ran from . Denies SI, HI, auditory, visual or tactile hallucinations. Pt tachy, denies chest pain (will go to  pending EKG results) Pt is under arrest for cutting off the power to her neighbors home. Pts son then told [employer] that his mother has been acting very paranoid lately and that he doesn't feel safe at home with her. As per son she has been talking to herself a lot, being aggressive (never physical). Pt also ran from . Denies SI, HI, auditory, visual or tactile hallucinations. Pt tachy, denies chest pain (will go to  pending EKG results)

## 2025-03-11 VITALS
HEART RATE: 108 BPM | OXYGEN SATURATION: 99 % | RESPIRATION RATE: 16 BRPM | DIASTOLIC BLOOD PRESSURE: 90 MMHG | TEMPERATURE: 98 F | SYSTOLIC BLOOD PRESSURE: 126 MMHG

## 2025-03-11 LAB
ALBUMIN SERPL ELPH-MCNC: 4.3 G/DL — SIGNIFICANT CHANGE UP (ref 3.3–5)
ALP SERPL-CCNC: 100 U/L — SIGNIFICANT CHANGE UP (ref 40–120)
ALT FLD-CCNC: 18 U/L — SIGNIFICANT CHANGE UP (ref 4–33)
ANION GAP SERPL CALC-SCNC: 16 MMOL/L — HIGH (ref 7–14)
APAP SERPL-MCNC: 11 UG/ML — LOW (ref 15–25)
AST SERPL-CCNC: 28 U/L — SIGNIFICANT CHANGE UP (ref 4–32)
BASOPHILS # BLD AUTO: 0.08 K/UL — SIGNIFICANT CHANGE UP (ref 0–0.2)
BASOPHILS NFR BLD AUTO: 0.7 % — SIGNIFICANT CHANGE UP (ref 0–2)
BILIRUB SERPL-MCNC: 0.5 MG/DL — SIGNIFICANT CHANGE UP (ref 0.2–1.2)
BUN SERPL-MCNC: 5 MG/DL — LOW (ref 7–23)
CALCIUM SERPL-MCNC: 9.9 MG/DL — SIGNIFICANT CHANGE UP (ref 8.4–10.5)
CHLORIDE SERPL-SCNC: 105 MMOL/L — SIGNIFICANT CHANGE UP (ref 98–107)
CO2 SERPL-SCNC: 15 MMOL/L — LOW (ref 22–31)
CREAT SERPL-MCNC: 0.7 MG/DL — SIGNIFICANT CHANGE UP (ref 0.5–1.3)
EGFR: 109 ML/MIN/1.73M2 — SIGNIFICANT CHANGE UP
EOSINOPHIL # BLD AUTO: 0.03 K/UL — SIGNIFICANT CHANGE UP (ref 0–0.5)
EOSINOPHIL NFR BLD AUTO: 0.3 % — SIGNIFICANT CHANGE UP (ref 0–6)
ETHANOL SERPL-MCNC: <10 MG/DL — SIGNIFICANT CHANGE UP
GLUCOSE SERPL-MCNC: 121 MG/DL — HIGH (ref 70–99)
HCG SERPL-ACNC: <1 MIU/ML — SIGNIFICANT CHANGE UP
HCT VFR BLD CALC: 38.2 % — SIGNIFICANT CHANGE UP (ref 34.5–45)
HGB BLD-MCNC: 13.4 G/DL — SIGNIFICANT CHANGE UP (ref 11.5–15.5)
IANC: 7.91 K/UL — HIGH (ref 1.8–7.4)
IMM GRANULOCYTES NFR BLD AUTO: 0.1 % — SIGNIFICANT CHANGE UP (ref 0–0.9)
LYMPHOCYTES # BLD AUTO: 2.51 K/UL — SIGNIFICANT CHANGE UP (ref 1–3.3)
LYMPHOCYTES # BLD AUTO: 22.9 % — SIGNIFICANT CHANGE UP (ref 13–44)
MCHC RBC-ENTMCNC: 26.3 PG — LOW (ref 27–34)
MCHC RBC-ENTMCNC: 35.1 G/DL — SIGNIFICANT CHANGE UP (ref 32–36)
MCV RBC AUTO: 75 FL — LOW (ref 80–100)
MONOCYTES # BLD AUTO: 0.42 K/UL — SIGNIFICANT CHANGE UP (ref 0–0.9)
MONOCYTES NFR BLD AUTO: 3.8 % — SIGNIFICANT CHANGE UP (ref 2–14)
NEUTROPHILS # BLD AUTO: 7.91 K/UL — HIGH (ref 1.8–7.4)
NEUTROPHILS NFR BLD AUTO: 72.2 % — SIGNIFICANT CHANGE UP (ref 43–77)
NRBC # BLD AUTO: 0 K/UL — SIGNIFICANT CHANGE UP (ref 0–0)
NRBC # FLD: 0 K/UL — SIGNIFICANT CHANGE UP (ref 0–0)
NRBC BLD AUTO-RTO: 0 /100 WBCS — SIGNIFICANT CHANGE UP (ref 0–0)
PLATELET # BLD AUTO: 339 K/UL — SIGNIFICANT CHANGE UP (ref 150–400)
POTASSIUM SERPL-MCNC: 4.8 MMOL/L — SIGNIFICANT CHANGE UP (ref 3.5–5.3)
POTASSIUM SERPL-SCNC: 4.8 MMOL/L — SIGNIFICANT CHANGE UP (ref 3.5–5.3)
PROT SERPL-MCNC: 7.9 G/DL — SIGNIFICANT CHANGE UP (ref 6–8.3)
RBC # BLD: 5.09 M/UL — SIGNIFICANT CHANGE UP (ref 3.8–5.2)
RBC # FLD: 14.4 % — SIGNIFICANT CHANGE UP (ref 10.3–14.5)
SALICYLATES SERPL-MCNC: <0.3 MG/DL — LOW (ref 15–30)
SARS-COV-2 RNA SPEC QL NAA+PROBE: SIGNIFICANT CHANGE UP
SODIUM SERPL-SCNC: 136 MMOL/L — SIGNIFICANT CHANGE UP (ref 135–145)
TOXICOLOGY SCREEN, DRUGS OF ABUSE, SERUM RESULT: SIGNIFICANT CHANGE UP
TSH SERPL-MCNC: 2.35 UIU/ML — SIGNIFICANT CHANGE UP (ref 0.27–4.2)
WBC # BLD: 10.96 K/UL — HIGH (ref 3.8–10.5)
WBC # FLD AUTO: 10.96 K/UL — HIGH (ref 3.8–10.5)

## 2025-03-11 RX ORDER — LORAZEPAM 4 MG/ML
2 VIAL (ML) INJECTION ONCE
Refills: 0 | Status: DISCONTINUED | OUTPATIENT
Start: 2025-03-11 | End: 2025-03-11

## 2025-03-11 RX ORDER — HALOPERIDOL 10 MG/1
5 TABLET ORAL ONCE
Refills: 0 | Status: COMPLETED | OUTPATIENT
Start: 2025-03-11 | End: 2025-03-11

## 2025-03-11 RX ORDER — ACETAMINOPHEN 500 MG/5ML
650 LIQUID (ML) ORAL ONCE
Refills: 0 | Status: COMPLETED | OUTPATIENT
Start: 2025-03-11 | End: 2025-03-11

## 2025-03-11 RX ADMIN — Medication 650 MILLIGRAM(S): at 02:03

## 2025-03-11 NOTE — ED BEHAVIORAL HEALTH ASSESSMENT NOTE - DESCRIPTION
calm, cooperative   Vital Signs Last 24 Hrs  T(C): 37.1 (10 Mar 2025 22:32), Max: 37.1 (10 Mar 2025 22:32)  T(F): 98.8 (10 Mar 2025 22:32), Max: 98.8 (10 Mar 2025 22:32)  HR: 118 (10 Mar 2025 23:11) (118 - 129)  BP: 109/80 (10 Mar 2025 22:32) (109/80 - 109/80)  BP(mean): --  RR: 20 (10 Mar 2025 22:32) (20 - 20)  SpO2: 99% (10 Mar 2025 22:32) (99% - 99%)    Parameters below as of 10 Mar 2025 22:32  Patient On (Oxygen Delivery Method): room air see HPI Used to work as mental health tech, works with [employer] gastric bypass

## 2025-03-11 NOTE — ED BEHAVIORAL HEALTH ASSESSMENT NOTE - SUMMARY
he patient is 41 yo single female mother of 15 yo boy; resides with her family (parents and her son ), taking care of her home bound 69 yo father (CDPAP) no HX of violence, 2 prior psych admissions for psychosis in 2011and Dec 2024 . no HX of SA, was d/yuko without meds, not in treatment at present time, no HX of substance abuse, no legal issues. PMHX gastric bypass. pt was BIB EMS after her neighbor called 911 on her because pt turned off her lights   patient resides with her parents and takes care of her 69 yo father, She reports that she is able to take care of her family and herself, she reports that she came home today after bringing her soon back from school, she felt tired and wanted to make some coffee, but her neighbor was loud and she went upstairs "to talk to her" when she came back downstairs the neighbor continued being loud and she went upstairs' again. patient reports that the neighbor then cursed her out and was yelling at her, and "I had a headache" she came back downstairs and turned off her neighbor's light "I was annoyed" . patient states that "the neighbor downstaters and started yelling at her, she went to the backyard to avoid her and she (the neighbor) called 911, when polices came she did not want to come to the hospital and locked the door and did not want to open the door, but "they convinced me" she states that she is OK, still has some headache.. No other complaints She denies being depressed, no anhedonia, sadness, states that she has good appetite, no insomnia, no anxiety, She denies hearing voices, no visions or delusions She states that she is not "delusional or paranoid" No IOR, no paranoia or grandiosity . As per the mother pt is pacing and talking to herself and turns off all the appliances at home, because she feels paranoid, (see [facility] note) patient reports that she turns "things off because of safety and does not want the bill to be high" "yes even the refrigerator, I mario it on again in couple of hours" She denies any acute psychotic symptoms,   She is not on any meds at present time , when she was hospitalized last time she refused to take any psych meds and was taken to court "but  let me go"   Will observe the patient overnight and will d/c pt in Am if she sleeps well and does not get agitated or psychotic   She seems calm and not internally preoccupied.

## 2025-03-11 NOTE — ED ADULT NURSE REASSESSMENT NOTE - NS ED NURSE REASSESS COMMENT FT1
pt requested to leave and pay for her own transportation, states could no longer wait for SW. DC instructions given to pt and pt changed into own clothes and escorted out of ED

## 2025-03-11 NOTE — ED BEHAVIORAL HEALTH NOTE - BEHAVIORAL HEALTH NOTE
SW called pt's mother, Marleni, [phone] for collateral information.  She seemed ok when she first came home from the hospital, then she stopped talking.  Mom states she herself was at work Sunday and Monday, so she didn't see her much, but states her tenants called her and told her that pt had been turning off the breaker in the apartment since Saturday  and pt is telling the tenants they are not allowed in the basement.  Per mother, pt's tenants called the police as a result.    Per mom, she is not sure if pt is taking her medication; states she may need to have something to make her feel better  Trevor, her 16 year old son, has been going to school; has other family members in the residence as well    She has been saying she doesn't want the fridge connected, has been disconnecting the stove, the , the oven; per mom, every time she unplugs it, she doesn't let others turn the appliances on.    States she has never really been like this before, so she is not sure what is going on. REFUGIO called pt's mother, Marleni, [phone] for collateral information.  She seemed ok when she first came home from the hospital, then she stopped talking.  Mom states she herself was at work Sunday and Monday, so she didn't see her much, but states her tenants called her and told her that pt had been turning off the breaker in the apartment since Saturday  and pt is telling the tenants they are not allowed in the basement.  Per mother, pt's tenants called the police as a result.    Per mom, she is not sure if pt is taking her medication; states she may need to have something to make her feel better  Trevor, her 16 year old son, has been going to school; has other family members in the residence as well    She has been saying she doesn't want the fridge connected, has been disconnecting the stove, the , the oven; per mom, every time she unplugs it, she doesn't let others turn the appliances on.    States she has never really been like this before, so she is not sure what is going on.    REFUGIO called pt's mother to inform her of pt's pending d/c-per mom she states that she is concerned because pt keeps turning off the breaker in the house and this is a concern because her  is bedbound and needs the electricity to move the bed when needed.  She states she does not feel safe having her in the house due to her turning the  continuously.

## 2025-03-11 NOTE — ED BEHAVIORAL HEALTH ASSESSMENT NOTE - HPI (INCLUDE ILLNESS QUALITY, SEVERITY, DURATION, TIMING, CONTEXT, MODIFYING FACTORS, ASSOCIATED SIGNS AND SYMPTOMS)
the patient is 39 yo single female mother of 17 yo boy; resides with her family (parents and her son ), taking care of her home bound 69 yo father (CDPAP) no HX of violence, 2 prior psych admissions for psychosis in 2011and Dec 2024 . no HX of SA, was d/yuko without meds, not in treatment at present time, no HX of substance abuse, no legal issues. PMHX gastric bypass. pt was BIB EMS after her neighbor called 911 on her because pt turned off her lights   patient resides with her parents and takes care of her 69 yo father, She reports that she is able to take care of her family and herself, she reports that she came home today after bringing her soon back from school, she felt tired and wanted to make some coffee, but her neighbor was loud and she went upstairs "to talk to her" when she came back downstairs the neighbor continued being loud and she went upstairs' again. patient reports that the neighbor then cursed her out and was yelling at her, and "I had a headache" she came back downstairs and turned off her neighbor's light "I was annoyed" . patient states that "the neighbor downstaters and started yelling at her, she went to the backyard to avoid her and she (the neighbor) called 911, when polices came she did not want to come to the hospital and locked the door and did not want to open the door, but "they convinced me" she states that she is OK, still has some headache.. No other complaints She denies being depressed, no anhedonia, sadness, states that she has good appetite, no insomnia, no anxiety, She denies hearing voices, no visions or delusions She states that she is not "delusional or paranoid" No IOR, no paranoia or grandiosity . As per the mother pt is pacing and talking to herself and turns off all the appliances at home, because she feels paranoid, (see [facility] note) patient reports that she turns "things off because of safety and does not want the bill to be high" "yes even the refrigerator, I mario it on again in couple of hours" She denies any acute psychotic symptoms,   She is not on any meds at present time , when she was hospitalized last time she refused to take any psych meds and was taken to court "but  let me go"   Will oserve the patient overnight and will d/c pt in Am if she sleeps well and does not get agitated or psychotic   She seems calm and not internally preoccupied.

## 2025-03-11 NOTE — ED ADULT NURSE REASSESSMENT NOTE - NS ED NURSE REASSESS COMMENT FT1
Received pt from RN break coverage. Pt is awake, sitting up in bed with even unlabored respirations observed. Vitals as stated. Upon approach, pt is calm and reports she is waiting to be dsicharged so she can take her 15yo son to school in the morning. Pending psychiatric reassessment in AM. Ongoing monitoring for safety continues.

## 2025-03-11 NOTE — ED BEHAVIORAL HEALTH NOTE - BEHAVIORAL HEALTH NOTE
worker was informed to arrange taxi for patient upon discharge. worker met with patient who states she does need transport home and provided her home address as [phone] [place] [address]. Pt has no medicaid registered in system. worker utilized account 50 for pt's safe return home.   Booking #[id number]

## 2025-03-11 NOTE — ED BEHAVIORAL HEALTH ASSESSMENT NOTE - OTHER PAST PSYCHIATRIC HISTORY (INCLUDE DETAILS REGARDING ONSET, COURSE OF ILLNESS, INPATIENT/OUTPATIENT TREATMENT)
has refused meds in the past, 2 previous admission as above        Self harming: denies     Current MH treatment: denies     Violence/Legal: denies

## 2025-03-11 NOTE — ED ADULT NURSE REASSESSMENT NOTE - NS ED NURSE REASSESS COMMENT FT1
Patient remains awake, irritable, restless and making bizarre hyperreligious statements, repeatedly asking to be discharged by "[facility]". MD Temple made aware, attempted to speak with patient at bedside, however presented the same and fixated on leaving hospital. PRN medications ordered if patient's behaviors escalate. Vitals as stated. Ongoing monitoring for safety continues.